# Patient Record
Sex: MALE | Race: WHITE | Employment: OTHER | URBAN - METROPOLITAN AREA
[De-identification: names, ages, dates, MRNs, and addresses within clinical notes are randomized per-mention and may not be internally consistent; named-entity substitution may affect disease eponyms.]

---

## 2018-01-22 ENCOUNTER — APPOINTMENT (OUTPATIENT)
Dept: GENERAL RADIOLOGY | Age: 28
DRG: 501 | End: 2018-01-22
Attending: EMERGENCY MEDICINE
Payer: OTHER GOVERNMENT

## 2018-01-22 ENCOUNTER — HOSPITAL ENCOUNTER (INPATIENT)
Age: 28
LOS: 4 days | Discharge: HOME | DRG: 501 | End: 2018-01-26
Attending: EMERGENCY MEDICINE | Admitting: ORTHOPAEDIC SURGERY
Payer: OTHER GOVERNMENT

## 2018-01-22 DIAGNOSIS — S96.122A: ICD-10-CM

## 2018-01-22 DIAGNOSIS — S91.312A: Primary | ICD-10-CM

## 2018-01-22 PROBLEM — S96.129A: Status: ACTIVE | Noted: 2018-01-22

## 2018-01-22 LAB
ANION GAP SERPL CALC-SCNC: 14 MMOL/L (ref 5–15)
BASOPHILS # BLD: 0 K/UL (ref 0–0.1)
BASOPHILS NFR BLD: 1 % (ref 0–1)
BUN SERPL-MCNC: 19 MG/DL (ref 6–20)
BUN/CREAT SERPL: 15 (ref 12–20)
CALCIUM SERPL-MCNC: 8.8 MG/DL (ref 8.5–10.1)
CHLORIDE SERPL-SCNC: 104 MMOL/L (ref 97–108)
CO2 SERPL-SCNC: 20 MMOL/L (ref 21–32)
CREAT SERPL-MCNC: 1.29 MG/DL (ref 0.7–1.3)
EOSINOPHIL # BLD: 0.4 K/UL (ref 0–0.4)
EOSINOPHIL NFR BLD: 4 % (ref 0–7)
ERYTHROCYTE [DISTWIDTH] IN BLOOD BY AUTOMATED COUNT: 11.7 % (ref 11.5–14.5)
GLUCOSE SERPL-MCNC: 109 MG/DL (ref 65–100)
HCT VFR BLD AUTO: 46.4 % (ref 36.6–50.3)
HGB BLD-MCNC: 16.8 G/DL (ref 12.1–17)
LYMPHOCYTES # BLD: 4.5 K/UL (ref 0.8–3.5)
LYMPHOCYTES NFR BLD: 51 % (ref 12–49)
MCH RBC QN AUTO: 31.5 PG (ref 26–34)
MCHC RBC AUTO-ENTMCNC: 36.2 G/DL (ref 30–36.5)
MCV RBC AUTO: 87.1 FL (ref 80–99)
MONOCYTES # BLD: 0.8 K/UL (ref 0–1)
MONOCYTES NFR BLD: 9 % (ref 5–13)
NEUTS SEG # BLD: 3.1 K/UL (ref 1.8–8)
NEUTS SEG NFR BLD: 35 % (ref 32–75)
PLATELET # BLD AUTO: 268 K/UL (ref 150–400)
POTASSIUM SERPL-SCNC: 3.8 MMOL/L (ref 3.5–5.1)
RBC # BLD AUTO: 5.33 M/UL (ref 4.1–5.7)
SODIUM SERPL-SCNC: 138 MMOL/L (ref 136–145)
WBC # BLD AUTO: 8.8 K/UL (ref 4.1–11.1)

## 2018-01-22 PROCEDURE — 85025 COMPLETE CBC W/AUTO DIFF WBC: CPT | Performed by: EMERGENCY MEDICINE

## 2018-01-22 PROCEDURE — 80048 BASIC METABOLIC PNL TOTAL CA: CPT | Performed by: EMERGENCY MEDICINE

## 2018-01-22 PROCEDURE — 74011250637 HC RX REV CODE- 250/637: Performed by: PHYSICIAN ASSISTANT

## 2018-01-22 PROCEDURE — 74011250636 HC RX REV CODE- 250/636: Performed by: PHYSICIAN ASSISTANT

## 2018-01-22 PROCEDURE — 74011000258 HC RX REV CODE- 258: Performed by: EMERGENCY MEDICINE

## 2018-01-22 PROCEDURE — 90715 TDAP VACCINE 7 YRS/> IM: CPT | Performed by: EMERGENCY MEDICINE

## 2018-01-22 PROCEDURE — 96361 HYDRATE IV INFUSION ADD-ON: CPT

## 2018-01-22 PROCEDURE — 86900 BLOOD TYPING SEROLOGIC ABO: CPT | Performed by: EMERGENCY MEDICINE

## 2018-01-22 PROCEDURE — 74011250636 HC RX REV CODE- 250/636

## 2018-01-22 PROCEDURE — 99283 EMERGENCY DEPT VISIT LOW MDM: CPT

## 2018-01-22 PROCEDURE — 73630 X-RAY EXAM OF FOOT: CPT

## 2018-01-22 PROCEDURE — 90471 IMMUNIZATION ADMIN: CPT

## 2018-01-22 PROCEDURE — 74011250636 HC RX REV CODE- 250/636: Performed by: EMERGENCY MEDICINE

## 2018-01-22 PROCEDURE — 96365 THER/PROPH/DIAG IV INF INIT: CPT

## 2018-01-22 PROCEDURE — 65270000029 HC RM PRIVATE

## 2018-01-22 PROCEDURE — 96375 TX/PRO/DX INJ NEW DRUG ADDON: CPT

## 2018-01-22 PROCEDURE — 75810000295 HC COMPLEX WND RPR

## 2018-01-22 PROCEDURE — 0QDR0ZZ EXTRACTION OF LEFT TOE PHALANX, OPEN APPROACH: ICD-10-PCS | Performed by: ORTHOPAEDIC SURGERY

## 2018-01-22 PROCEDURE — 74011000258 HC RX REV CODE- 258: Performed by: PHYSICIAN ASSISTANT

## 2018-01-22 PROCEDURE — 36415 COLL VENOUS BLD VENIPUNCTURE: CPT | Performed by: EMERGENCY MEDICINE

## 2018-01-22 PROCEDURE — 74011000250 HC RX REV CODE- 250: Performed by: EMERGENCY MEDICINE

## 2018-01-22 RX ORDER — ONDANSETRON 2 MG/ML
4 INJECTION INTRAMUSCULAR; INTRAVENOUS
Status: DISPENSED | OUTPATIENT
Start: 2018-01-22 | End: 2018-01-23

## 2018-01-22 RX ORDER — CEFAZOLIN SODIUM/WATER 2 G/20 ML
2 SYRINGE (ML) INTRAVENOUS EVERY 8 HOURS
Status: DISPENSED | OUTPATIENT
Start: 2018-01-22 | End: 2018-01-24

## 2018-01-22 RX ORDER — HYDROMORPHONE HYDROCHLORIDE 2 MG/ML
0.5 INJECTION, SOLUTION INTRAMUSCULAR; INTRAVENOUS; SUBCUTANEOUS
Status: DISCONTINUED | OUTPATIENT
Start: 2018-01-22 | End: 2018-01-22

## 2018-01-22 RX ORDER — FENTANYL CITRATE 50 UG/ML
INJECTION, SOLUTION INTRAMUSCULAR; INTRAVENOUS
Status: COMPLETED
Start: 2018-01-22 | End: 2018-01-22

## 2018-01-22 RX ORDER — ONDANSETRON 2 MG/ML
4 INJECTION INTRAMUSCULAR; INTRAVENOUS
Status: COMPLETED | OUTPATIENT
Start: 2018-01-22 | End: 2018-01-22

## 2018-01-22 RX ORDER — BUSPIRONE HYDROCHLORIDE 5 MG/1
5 TABLET ORAL 3 TIMES DAILY
COMMUNITY

## 2018-01-22 RX ORDER — VENLAFAXINE 37.5 MG/1
37.5 TABLET ORAL DAILY
COMMUNITY

## 2018-01-22 RX ORDER — HYDROMORPHONE HYDROCHLORIDE 2 MG/ML
0.5 INJECTION, SOLUTION INTRAMUSCULAR; INTRAVENOUS; SUBCUTANEOUS
Status: DISPENSED | OUTPATIENT
Start: 2018-01-22 | End: 2018-01-23

## 2018-01-22 RX ORDER — SODIUM CHLORIDE 0.9 % (FLUSH) 0.9 %
5-10 SYRINGE (ML) INJECTION AS NEEDED
Status: DISCONTINUED | OUTPATIENT
Start: 2018-01-22 | End: 2018-01-26 | Stop reason: HOSPADM

## 2018-01-22 RX ORDER — NALOXONE HYDROCHLORIDE 0.4 MG/ML
0.4 INJECTION, SOLUTION INTRAMUSCULAR; INTRAVENOUS; SUBCUTANEOUS AS NEEDED
Status: DISCONTINUED | OUTPATIENT
Start: 2018-01-22 | End: 2018-01-26 | Stop reason: HOSPADM

## 2018-01-22 RX ORDER — VENLAFAXINE 100 MG/1
100 TABLET ORAL DAILY
COMMUNITY
End: 2018-01-22

## 2018-01-22 RX ORDER — MORPHINE SULFATE 4 MG/ML
6 INJECTION, SOLUTION INTRAMUSCULAR; INTRAVENOUS
Status: COMPLETED | OUTPATIENT
Start: 2018-01-22 | End: 2018-01-22

## 2018-01-22 RX ORDER — DIPHENHYDRAMINE HYDROCHLORIDE 50 MG/ML
12.5 INJECTION, SOLUTION INTRAMUSCULAR; INTRAVENOUS
Status: ACTIVE | OUTPATIENT
Start: 2018-01-22 | End: 2018-01-23

## 2018-01-22 RX ORDER — ACETAMINOPHEN 500 MG
1000 TABLET ORAL EVERY 6 HOURS
Status: DISCONTINUED | OUTPATIENT
Start: 2018-01-22 | End: 2018-01-26 | Stop reason: HOSPADM

## 2018-01-22 RX ORDER — SODIUM CHLORIDE 0.9 % (FLUSH) 0.9 %
5-10 SYRINGE (ML) INJECTION EVERY 8 HOURS
Status: DISCONTINUED | OUTPATIENT
Start: 2018-01-22 | End: 2018-01-26 | Stop reason: HOSPADM

## 2018-01-22 RX ORDER — HYDROMORPHONE HYDROCHLORIDE 2 MG/1
2 TABLET ORAL
Status: DISCONTINUED | OUTPATIENT
Start: 2018-01-22 | End: 2018-01-26 | Stop reason: HOSPADM

## 2018-01-22 RX ORDER — SODIUM CHLORIDE 9 MG/ML
100 INJECTION, SOLUTION INTRAVENOUS CONTINUOUS
Status: DISCONTINUED | OUTPATIENT
Start: 2018-01-22 | End: 2018-01-24

## 2018-01-22 RX ORDER — NICOTINE 7MG/24HR
1 PATCH, TRANSDERMAL 24 HOURS TRANSDERMAL DAILY
Status: DISCONTINUED | OUTPATIENT
Start: 2018-01-23 | End: 2018-01-24

## 2018-01-22 RX ORDER — BUPIVACAINE HYDROCHLORIDE 5 MG/ML
5 INJECTION, SOLUTION EPIDURAL; INTRACAUDAL
Status: COMPLETED | OUTPATIENT
Start: 2018-01-22 | End: 2018-01-22

## 2018-01-22 RX ORDER — IBUPROFEN 200 MG
1 TABLET ORAL DAILY
Status: DISCONTINUED | OUTPATIENT
Start: 2018-01-22 | End: 2018-01-24

## 2018-01-22 RX ORDER — FENTANYL CITRATE 50 UG/ML
100 INJECTION, SOLUTION INTRAMUSCULAR; INTRAVENOUS ONCE
Status: COMPLETED | OUTPATIENT
Start: 2018-01-22 | End: 2018-01-22

## 2018-01-22 RX ORDER — HYDROMORPHONE HYDROCHLORIDE 2 MG/1
4 TABLET ORAL
Status: DISCONTINUED | OUTPATIENT
Start: 2018-01-22 | End: 2018-01-26 | Stop reason: HOSPADM

## 2018-01-22 RX ADMIN — Medication 2 G: at 20:51

## 2018-01-22 RX ADMIN — FENTANYL CITRATE 100 MCG: 50 INJECTION, SOLUTION INTRAMUSCULAR; INTRAVENOUS at 13:57

## 2018-01-22 RX ADMIN — SODIUM CHLORIDE 100 ML/HR: 900 INJECTION, SOLUTION INTRAVENOUS at 20:02

## 2018-01-22 RX ADMIN — CEFAZOLIN SODIUM 1 G: 1 INJECTION, POWDER, FOR SOLUTION INTRAMUSCULAR; INTRAVENOUS at 14:31

## 2018-01-22 RX ADMIN — BUPIVACAINE HYDROCHLORIDE 25 MG: 5 INJECTION, SOLUTION EPIDURAL; INTRACAUDAL; PERINEURAL at 17:00

## 2018-01-22 RX ADMIN — LIDOCAINE HYDROCHLORIDE 15 MG: 10; .005 INJECTION, SOLUTION EPIDURAL; INFILTRATION; INTRACAUDAL; PERINEURAL at 17:00

## 2018-01-22 RX ADMIN — HYDROMORPHONE HYDROCHLORIDE 2 MG: 2 TABLET ORAL at 22:22

## 2018-01-22 RX ADMIN — TETANUS TOXOID, REDUCED DIPHTHERIA TOXOID AND ACELLULAR PERTUSSIS VACCINE, ADSORBED 0.5 ML: 5; 2.5; 8; 8; 2.5 SUSPENSION INTRAMUSCULAR at 16:33

## 2018-01-22 RX ADMIN — GENTAMICIN SULFATE 400 MG: 40 INJECTION, SOLUTION INTRAMUSCULAR; INTRAVENOUS at 20:04

## 2018-01-22 RX ADMIN — SODIUM CHLORIDE 1000 ML: 900 INJECTION, SOLUTION INTRAVENOUS at 13:55

## 2018-01-22 RX ADMIN — ACETAMINOPHEN 1000 MG: 325 TABLET, FILM COATED ORAL at 20:43

## 2018-01-22 RX ADMIN — ONDANSETRON HYDROCHLORIDE 4 MG: 2 INJECTION, SOLUTION INTRAMUSCULAR; INTRAVENOUS at 16:33

## 2018-01-22 RX ADMIN — HYDROMORPHONE HYDROCHLORIDE 2 MG: 2 TABLET ORAL at 20:43

## 2018-01-22 RX ADMIN — MORPHINE SULFATE 6 MG: 4 INJECTION, SOLUTION INTRAMUSCULAR; INTRAVENOUS at 16:34

## 2018-01-22 NOTE — ED PROVIDER NOTES
EMERGENCY DEPARTMENT HISTORY AND PHYSICAL EXAM      Date: 1/22/2018  Patient Name: Elzbieta Almeida    History of Presenting Illness     Chief Complaint   Patient presents with    Laceration     pt cut left foot with chainsaw, actively bleeding. pt states clotting disorder       History Provided By: Patient    HPI: Elzbieta Almeida, 28 y.o. male with PMHx significant for Von Willebrand disease, presents ambulatory to the ED with cc of laceration to his left foot. Pt reports he cut himself while using a chainsaw PTA today. He denies other injury at time of examination. Pt denies taking any medications on a daily basis. He denies taking any medications for his current symptoms. Pt specifically denies nausea, vomiting, fever, or chills. PCP: None    There are no other complaints, changes, or physical findings at this time. Current Outpatient Prescriptions   Medication Sig Dispense Refill    venlafaxine (EFFEXOR) 100 mg tablet Take 100 mg by mouth daily. Past History     Past Medical History:  Past Medical History:   Diagnosis Date    Depression     Psychiatric disorder     Von Willebrand disease ([facility] [address]        Past Surgical History:  Past Surgical History:   Procedure Laterality Date    HX ORTHOPAEDIC      calf, eduardo       Family History:  History reviewed. No pertinent family history. Social History:  Social History   Substance Use Topics    Smoking status: Current Every Day Smoker     Packs/day: 0.25    Smokeless tobacco: Current User    Alcohol use Yes      Comment: rare       Allergies: Allergies not on file      Review of Systems   Review of Systems   Constitutional: Negative. Negative for appetite change, chills, fatigue and fever. HENT: Negative. Negative for congestion, rhinorrhea, sinus pressure and sore throat. Eyes: Negative. Respiratory: Negative. Negative for cough, choking, chest tightness, shortness of breath and wheezing. Cardiovascular: Negative.   Negative for chest pain, palpitations and leg swelling. Gastrointestinal: Negative for abdominal pain, constipation, diarrhea, nausea and vomiting. Endocrine: Negative. Genitourinary: Negative. Negative for difficulty urinating, dysuria, flank pain and urgency. Musculoskeletal: Negative. Skin: Positive for wound (left foot). Neurological: Negative. Negative for dizziness, speech difficulty, weakness, light-headedness, numbness and headaches. Psychiatric/Behavioral: Negative. All other systems reviewed and are negative. Physical Exam   Physical Exam   Constitutional: She is oriented to person, place, and time. She appears well-developed and well-nourished. No distress. HENT:   Head: Normocephalic and atraumatic. Mouth/Throat: Oropharynx is clear and moist. No oropharyngeal exudate. Eyes: Conjunctivae and EOM are normal. Pupils are equal, round, and reactive to light. Neck: Normal range of motion. Neck supple. No JVD present. No tracheal deviation present. Cardiovascular: Normal rate, regular rhythm, normal heart sounds and intact distal pulses. No murmur heard. Pulmonary/Chest: Effort normal and breath sounds normal. No stridor. No respiratory distress. She has no wheezes. She has no rales. She exhibits no tenderness. Musculoskeletal: Normal range of motion. He exhibits no edema. Open wound of left foot ~3cm in length overlying the 1st metatarsal, pt is unable to extended left great toe, cap refill 3 secs, DP,PT intact   Neurological: She is alert and oriented to person, place, and time. No cranial nerve deficit. No gross motor or sensory deficits    Skin: Skin is warm and dry. She is not diaphoretic. Psychiatric:   Pt very anxious    Nursing note and vitals reviewed.         Diagnostic Study Results     Labs -     Recent Results (from the past 12 hour(s))   CBC WITH AUTOMATED DIFF    Collection Time: 01/22/18  2:15 PM   Result Value Ref Range    WBC 8.8 4.1 - 11.1 K/uL    RBC 5.33 4.10 - 5.70 M/uL    HGB 16.8 12.1 - 17.0 g/dL    HCT 46.4 36.6 - 50.3 %    MCV 87.1 80.0 - 99.0 FL    MCH 31.5 26.0 - 34.0 PG    MCHC 36.2 30.0 - 36.5 g/dL    RDW 11.7 11.5 - 14.5 %    PLATELET 477 019 - 280 K/uL    NEUTROPHILS 35 32 - 75 %    LYMPHOCYTES 51 (H) 12 - 49 %    MONOCYTES 9 5 - 13 %    EOSINOPHILS 4 0 - 7 %    BASOPHILS 1 0 - 1 %    ABS. NEUTROPHILS 3.1 1.8 - 8.0 K/UL    ABS. LYMPHOCYTES 4.5 (H) 0.8 - 3.5 K/UL    ABS. MONOCYTES 0.8 0.0 - 1.0 K/UL    ABS. EOSINOPHILS 0.4 0.0 - 0.4 K/UL    ABS. BASOPHILS 0.0 0.0 - 0.1 K/UL   METABOLIC PANEL, BASIC    Collection Time: 01/22/18  2:15 PM   Result Value Ref Range    Sodium 138 136 - 145 mmol/L    Potassium 3.8 3.5 - 5.1 mmol/L    Chloride 104 97 - 108 mmol/L    CO2 20 (L) 21 - 32 mmol/L    Anion gap 14 5 - 15 mmol/L    Glucose 109 (H) 65 - 100 mg/dL    BUN 19 6 - 20 MG/DL    Creatinine 1.29 0.70 - 1.30 MG/DL    BUN/Creatinine ratio 15 12 - 20      GFR est AA >60 >60 ml/min/1.73m2    GFR est non-AA >60 >60 ml/min/1.73m2    Calcium 8.8 8.5 - 10.1 MG/DL       Radiologic Studies -   XR FOOT LT MIN 3 V   Final Result   EXAM:  XR FOOT LEFT MIN 3 V     INDICATION:   Chainsaw injury to left foot.     COMPARISON:  None.     FINDINGS:  Three views of the right foot demonstrate a 10 x 7 x 10 mm defect in  the dorsal medial aspect of the distal first metatarsal with overlying bandage  artifact. Soft tissue swelling is present.     IMPRESSION  IMPRESSION: Partial open fracture of the left distal first metatarsal.       Medical Decision Making   I am the first provider for this patient. I reviewed the vital signs, available nursing notes, past medical history, past surgical history, family history and social history. Vital Signs-Reviewed the patient's vital signs.   Patient Vitals for the past 12 hrs:   Temp Pulse Resp BP SpO2   01/22/18 1400 97.7 °F (36.5 °C) (!) 102 (!) 44 (!) 154/115 99 %       Pulse Oximetry Analysis - 99% on RA    Cardiac Monitor:     Records Reviewed: Nursing Notes and Old Medical Records    Provider Notes (Medical Decision Making):     DDx: open fx, laceration, acute blood loss anemia    ED Course:   Initial assessment performed. The patients presenting problems have been discussed, and they are in agreement with the care plan formulated and outlined with them. I have encouraged them to ask questions as they arise throughout their visit. Upon arrival pt still wearing work boots, the integrity of the boot has been compromised and there is blood coming from the top of the boot, active bleeding suspect but unable to visualized the wound. Tourniquet applied and tight till bleeding stopped, no loss of distal pulses with tourniquet, Boot was removed and wound identified, wound was injected with 8 cc's Lidocaine 1% with epi, tourniquet taken off, no active bleeding, non-adherent dressing applied, will dose pt with IV Ab and pt will be given tetanus, Shashank Bucio DO    Xray performed, concern for tendon rupture, ? Fracture will ortho evaluate, Shashank Bucio DO      Consult Note:  4:06 PM  Shashank Bucio DO spoke with Chely Dior MD  Specialty: Orthopedics  Discussed pts hx, disposition, and available diagnostic and imaging results. Reviewed care plans. Consultant agrees with plans as outlined. He will see pt in ED    SIGN OUT:  4:06 PM  Patient's presentation, labs/imaging and plan of care was reviewed with Dharmesh Thapa DO as part of sign out. They will assume care as part of the plan discussed with the patient. Dharmesh Thapa DO's assistance in completion of this plan is greatly appreciated but it should be noted that I will be the provider of record for this patient. Dominique Servin DO    Disposition:  Pt taken to the OR by orthopaedic for wound exploration and repair, Shashank Bucio DO        PLAN:  1. Current Discharge Medication List        2.    Follow-up Information     None        Return to ED if worse     Diagnosis     Clinical Impression: 1. Laceration of left foot excluding toes with complication, initial encounter        Attestations: This note is prepared by Ryan Hunt, acting as Scribe for Katja Gordon DO: The scribe's documentation has been prepared under my direction and personally reviewed by me in its entirety. I confirm that the note above accurately reflects all work, treatment, procedures, and medical decision making performed by me.

## 2018-01-22 NOTE — ED NOTES
Received pt to exam room, resting on stretcher in position of comfort, call bell given to pt; pt states he was using a chainsaw that slipped, cutting throught left foot boot, upon arrival blood is squirting through boot, 2 tourniquets and manual pressure applied, dr Renetta Antunez to room to evaluate; foot continued to bleed, trauma tourniquet applied to right thigh by dr Renetta Antunez

## 2018-01-22 NOTE — H&P
Orthopedic Generic Pre-Op History and Physical    2018 5:43 PM     Jamey Melton  [id number]  male  28 y.o.   1990    Subjective:      Jamey Melton is a 28 y.o. male who presents for evaluation of Left Foot, Great Toe, extensor hallucis tendon lacertion after a chainsaw accident. Presented to ED and consulted for evaluation. He has a large laceration on the dorsal medial aspect of the left foot at the level of the base of the 1st metatarsal, inability to move his great toe. 10/10 pain. Past Medical History:   Diagnosis Date    Depression     Psychiatric disorder     Von Willebrand disease ([facility] [address]      Past Surgical History:   Procedure Laterality Date    HX ORTHOPAEDIC      calf, eduardo      History reviewed. No pertinent family history. Social History   Substance Use Topics    Smoking status: Current Every Day Smoker     Packs/day: 0.25    Smokeless tobacco: Current User    Alcohol use Yes      Comment: rare      Prior to Admission medications    Medication Sig Start Date End Date Taking? Authorizing Provider   venlafaxine (EFFEXOR) 100 mg tablet Take 100 mg by mouth daily. Yes Phys Other, MD      No Known Allergies    Review of Systems    Objective:     Vitals:    18 1400 18 1637   BP: (!) 154/115 (!) 138/96   Pulse: (!) 102 66   Resp: (!) 44 16   Temp: 97.7 °F (36.5 °C)    SpO2: 99% 99%   Weight: 88.5 kg (195 lb)    Height: 5' 11\" (1.803 m)        Temp (24hrs), Av.7 °F (36.5 °C), Min:97.7 °F (36.5 °C), Max:97.7 °F (36.5 °C)        Assessment:   Left foot laceration  Left extensor hallucis tendon laceration    Plan:   I and D with Donald Owen in the ED with attempted extensor hallucis repair.   Admit for IV antibiotics Ancef and Gent  Pain control with hydromorphone   Left leg partial weight bearing on the heel  Follow up with Dr. Osorio Settler outpatient after IV antibiotics    Signed By: Zannie Gosselin, PA     2018       Date of Surgery Update:  Jamey Melton was seen and examined. History and physical has been reviewed. The patient has been examined. There have been no significant clinical changes since the completion of the originally dated History and Physical.  Patient identified by surgeon; surgical site was confirmed by patient and surgeon. Plan to proceed with I&D, exploration and primary EHL repair vs. EDL to EHL transfer and EDB to EDL transfer. Will require 24 - 48 of iv abx postop depending on contamination of wound upon intraop inspection. Signed By: Blayne Soares MD     January 24, 2018 11:51 AM

## 2018-01-22 NOTE — PROGRESS NOTES
Pharmacy Clarification of the Prior to Admission Medication Regimen Retrospective to the Admission Medication Reconciliation    The patient was interviewed regarding clarification of the prior to admission medication regimen. Patient's significant other was present in room and obtained permission from patient to discuss drug regimen with visitor(s) present. Patient was questioned regarding use of any other inhalers, topical products, over the counter medications, herbal medications, vitamin products or ophthalmic/nasal/otic medication use. Information Obtained From: Patient, Patient's Significant Other, and MARIS RUBIO    Recommendations/Findings: The following amendments were made to the patient's active medication list on file at [facility]:     1) Additions:    DM/P-EPHED/ACETAMINOPH/DOXYLAM (NYQUIL PO)   DM/PSEUDOEPHED/ACETAMINOPH/CPM (THERAFLU MS SEVERE COLD &COUGH PO)   DM/PSEUDOEPHED/ACETAMINOPHEN (VICKS DAYQUIL PO)   busPIRone (BUSPAR) 5 mg tablet    2) Removals: None     3) Changes:   venlafaxine (EFFEXOR) tablet (Old regimen: (strength/dose) 100 mg tab/100 mg /New regimen: (strength/dose) 37.5 mg tab/37.5 mg)    4) Pertinent Pharmacy Findings:   Updated patients preferred outpatient pharmacy to: [facility] [place], [facility] [address] [place] [address]   busPIRone (BUSPAR) 5 mg tablet: Patient states that he is should be taking this agent, but does not take it.  DM/P-EPHED/ACETAMINOPH/DOXYLAM (NYQUIL PO), DM/PSEUDOEPHED/ACETAMINOPH/CPM (THERAFLU MS SEVERE COLD &COUGH PO), and DM/PSEUDOEPHED/ACETAMINOPHEN (VICKS DAYQUIL PO): Patient stated that he took these PRN OTC agents at home, but was unaware of the strengths. PTA medication list was corrected to the following:     Prior to Admission Medications   Prescriptions Last Dose Informant Patient Reported? Taking?    DM/P-EPHED/ACETAMINOPH/DOXYLAM (NYQUIL PO) 1/15/2018 at Unknown time Self Yes Yes   Sig: Take  by mouth daily as needed for Cough ('congestion'). Patient takes 'one small cupful. '   DM/PSEUDOEPHED/ACETAMINOPH/CPM (THERAFLU MS SEVERE COLD &COUGH PO) 1/15/2018 at Unknown time Self Yes Yes   Sig: Take  by mouth daily as needed for Cough ('congestion'). Patient takes 'one small cupful. '   DM/PSEUDOEPHED/ACETAMINOPHEN (VICKS DAYQUIL PO) 1/15/2018 at Unknown time Self Yes Yes   Sig: Take  by mouth daily as needed for Cough ('congestion'). Patient takes 'one small cupful.'   busPIRone (BUSPAR) 5 mg tablet Not Taking at Unknown time Other Yes No   Sig: Take 5 mg by mouth three (3) times daily. venlafaxine (EFFEXOR) 37.5 mg tablet 1/22/2018 at Unknown time Other Yes Yes   Sig: Take 37.5 mg by mouth daily.       Facility-Administered Medications: None          Thank you,  Palak Villa, [facility]  Medication History Pharmacy Technician

## 2018-01-22 NOTE — PROGRESS NOTES
Pharmacy Automatic Renal Dosing Protocol - Antimicrobials    Indication for Antimicrobials: SSTI     Current Regimen of Each Antimicrobial:  Cefazolin 2 gm every 8 hours for 48 hours (Start Date ; Day # 1)  Gentamicin 1.5 mg/kg every 8 hours (, day 1)    Goal Level: PULSE DOSING GENTAMICIN  Peak: 15 - 20 mcg/mL  Trough: < or = 0.3 mcg/mL    Measured / Extrapolated Vancomycin Level:    /     Significant Cultures:       Paralysis, amputations, malnutrition:     Labs:  Recent Labs      18   1415   CREA  1.29   BUN  19   WBC  8.8     Temp (24hrs), Av.7 °F (36.5 °C), Min:97.7 °F (36.5 °C), Max:97.7 °F (36.5 °C)      Creatinine Clearance (mL/min) or Dialysis: 90  No results found for: PCT    Impression/Plan:   - pulse dosing gentamicin 400 mg every 24 hours  - Ancef 2 gm every 8 hours is appropriate for CrCl, however it is ordered to stop in 2 days? Pharmacy will follow daily and adjust medications as appropriate for renal function and/or serum levels. Thank you,  Rosa Asencio, PHARMD          Recommended duration of therapy  http://[facility]/[place]/[facility]/Pharmacy/Clinical%20Companion/Duration%20of%20ABX%20therapy. docx    Renal Dosing  http://[facility]/[place]/[facility]/Pharmacy/Clinical%20Companion/Renal%[email or web address]. pdf

## 2018-01-22 NOTE — IP AVS SNAPSHOT
[address] [facility] 
[phone] Patient: Zan Mcmillan MRN: [id number] [facility] [place]:1/78/7224 About your hospitalization You were admitted on:  January 22, 2018 You last received care in the:  [facility] ORTHOPEDICS You were discharged on:  January 25, 2018 Why you were hospitalized Your primary diagnosis was:  Not on File Your diagnoses also included:  Laceration Of Extensor Hallucis Longus Tendon Follow-up Information Follow up With Details Comments Contact Info Eran Rivera MD In 2 weeks  [address] [facility] [address] [facility]. 
[phone] None   None (395) Patient stated that they have no PCP Discharge Orders None A check ignacio indicates which time of day the medication should be taken. My Medications START taking these medications Instructions Each Dose to Equal  
 Morning Noon Evening Bedtime HYDROmorphone 2 mg tablet Commonly known as:  DILAUDID Your last dose was: Your next dose is: Take 1 Tab by mouth every four (4) hours as needed. Max Daily Amount: 12 mg.  
 2 mg CONTINUE taking these medications Instructions Each Dose to Equal  
 Morning Noon Evening Bedtime  
 busPIRone 5 mg tablet Commonly known as:  BUSPAR Your last dose was: Your next dose is: Take 5 mg by mouth three (3) times daily. 5 mg NYQUIL PO Your last dose was: Your next dose is: Take  by mouth daily as needed for Cough ('congestion'). Patient takes 'one small cupful.' THERAFLU MS SEVERE COLD &COUGH PO Your last dose was: Your next dose is: Take  by mouth daily as needed for Cough ('congestion'). Patient takes 'one small cupful.'  
     
   
   
   
  
 venlafaxine 37.5 mg tablet Commonly known as:  [facility] Your last dose was: Your next dose is: Take 37.5 mg by mouth daily. 37.5 mg  
    
   
   
   
  
 VICKS DAYQUIL PO Your last dose was: Your next dose is: Take  by mouth daily as needed for Cough ('congestion'). Patient takes 'one small cupful.' Where to Get Your Medications Information on where to get these meds will be given to you by the nurse or doctor. ! Ask your nurse or doctor about these medications HYDROmorphone 2 mg tablet Discharge Instructions [address] [facility] Jazmín Mcbride MD 
Postoperative Instructions Right/Left Lower Extremity: 
 ___Non Weight Bearing    ___Postop Shoe 
 ___Heel touch weightbearing               ___CAM Boot 
 _X__Weightbearing as tolerated   ___Splint/Cast 
 
Dressing: 
 _X__Keep Dressing or Splint clean & dry 
 _X__Do Not remove dressing; Dressing will be changed at first postoperative visit. 
 ___Other:   
 
Olga Mems: ? You may shower 48 hours after your surgery. Do Not allow dressing or splint to get wet! Diet:  
? Advance diet as tolerated. You may experience nausea due to anesthesia, pain or pain medications. You should avoid spicy or heavy meals initially. Pain Management: 
? If you have received a block, the pain relief can last from 4-24 hours. This also means you may not have sensation or movement in your foot for the same amount of time. ? You will have pain after the block wears off. Anticipate this and start pain medications prior to the block wearing off. 
? Do Not drive while taking narcotic pain medications. Elevation: 
? Strict elevation at or just above the level of your heart for the first 14 days after surgery. Limit time that foot is in dependent position for trips to bathroom and kitchen as much as possible. Early control of swelling will improve future swelling. Ice: ? __X___ Rudolph Snow may ice your surgical extremity for no longer than 20 minutes at a time, 3-4 times per day. Do Not apply ice directly to the skin. ? _____ Do Not apply ice to surgical extremity. ELEVATE THE LEFT LEG TO HELP WITH PAIN AND SWELLING Call our office at [phone] if the following occur: ? Severe swelling, profuse bleeding or severe pain which does not improve with pain medication. ? Fever greater than 101.0; fevers less than this are very common in the first few days after surgery and are unlikely to indicate infection. Follow up: In 2 weeks Introducing [facility]! Gena Bran introduces [employer] patient portal. Now you can access parts of your medical record, email your doctor's office, and request medication refills online. 1. In your internet browser, go to [email or web address] 2. Click on the First Time User? Click Here link in the Sign In box. You will see the New Member Sign Up page. 3. Enter your [employer] Access Code exactly as it appears below. You will not need to use this code after youve completed the sign-up process. If you do not sign up before the expiration date, you must request a new code. · [employer] Access Code: [email or web address] Expires: 4/22/2018  2:25 PM 
 
4. Enter the last four digits of your Social Security Number (xxxx) and Date of Birth (mm/dd/yyyy) as indicated and click Submit. You will be taken to the next sign-up page. 5. Create a [employer] ID. This will be your [employer] login ID and cannot be changed, so think of one that is secure and easy to remember. 6. Create a [employer] password. You can change your password at any time. 7. Enter your Password Reset Question and Answer. This can be used at a later time if you forget your password. 8. Enter your e-mail address. You will receive e-mail notification when new information is available in [address] 9. Click Sign Up. You can now view and download portions of your medical record. 10. Click the Download Summary menu link to download a portable copy of your medical information. If you have questions, please visit the Frequently Asked Questions section of the [employer] website. Remember, [employer] is NOT to be used for urgent needs. For medical emergencies, dial 911. Now available from your iPhone and Android! Providers Seen During Your Hospitalization Provider Specialty Primary office phone Susan Figueredo DO Emergency Medicine [phone] Caty Townsend MD Orthopedic Surgery [phone] Eran GARCIA [address] [place] [address] 2050, [address] [facility] Orthopedic Surgery [phone] Immunizations Administered for This Admission Name Date Tdap 1/22/2018 Your Primary Care Physician (PCP) Primary Care Physician Office Phone Office Fax NONE ** None ** ** None ** You are allergic to the following No active allergies Recent Documentation Height Weight BMI Smoking Status 1.803 m 88.5 kg 27.2 kg/m2 Current Every Day Smoker Emergency Contacts Name Discharge Info Relation Home Work Mobile MasonFidelina DISCHARGE CAREGIVER [3] Spouse [3] [phone] Patient Belongings The following personal items are in your possession at time of discharge: 
  Dental Appliances: None  Visual Aid: Glasses, Contacts, At bedside      Home Medications: None   Jewelry: None  Clothing:  (came down in gown, glasses to recovery)    Other Valuables: Cell Phone, Eyeglasses, Cigarettes  Personal Items Sent to Safe: none Please provide this summary of care documentation to your next provider. Signatures-by signing, you are acknowledging that this After Visit Summary has been reviewed with you and you have received a copy. Patient Signature:  ____________________________________________________________ Date:  ____________________________________________________________  
  
Loyda Must Provider Signature:  ____________________________________________________________ Date:  ____________________________________________________________

## 2018-01-23 ENCOUNTER — ANESTHESIA EVENT (OUTPATIENT)
Dept: SURGERY | Age: 28
DRG: 501 | End: 2018-01-23
Payer: OTHER GOVERNMENT

## 2018-01-23 LAB
ABO + RH BLD: NORMAL
ANION GAP SERPL CALC-SCNC: 5 MMOL/L (ref 5–15)
BASOPHILS # BLD: 0 K/UL (ref 0–0.1)
BASOPHILS NFR BLD: 0 % (ref 0–1)
BLOOD GROUP ANTIBODIES SERPL: NORMAL
BUN SERPL-MCNC: 17 MG/DL (ref 6–20)
BUN/CREAT SERPL: 18 (ref 12–20)
CALCIUM SERPL-MCNC: 7.9 MG/DL (ref 8.5–10.1)
CHLORIDE SERPL-SCNC: 107 MMOL/L (ref 97–108)
CO2 SERPL-SCNC: 27 MMOL/L (ref 21–32)
CREAT SERPL-MCNC: 0.94 MG/DL (ref 0.7–1.3)
DATE LAST DOSE: NORMAL
EOSINOPHIL # BLD: 0.1 K/UL (ref 0–0.4)
EOSINOPHIL NFR BLD: 1 % (ref 0–7)
ERYTHROCYTE [DISTWIDTH] IN BLOOD BY AUTOMATED COUNT: 12 % (ref 11.5–14.5)
GENTAMICIN SERPL-MCNC: <0.2 UG/ML
GLUCOSE SERPL-MCNC: 114 MG/DL (ref 65–100)
HCT VFR BLD AUTO: 39.7 % (ref 36.6–50.3)
HGB BLD-MCNC: 14.3 G/DL (ref 12.1–17)
LYMPHOCYTES # BLD: 1.5 K/UL (ref 0.8–3.5)
LYMPHOCYTES NFR BLD: 13 % (ref 12–49)
MCH RBC QN AUTO: 32.2 PG (ref 26–34)
MCHC RBC AUTO-ENTMCNC: 36 G/DL (ref 30–36.5)
MCV RBC AUTO: 89.4 FL (ref 80–99)
MONOCYTES # BLD: 0.8 K/UL (ref 0–1)
MONOCYTES NFR BLD: 7 % (ref 5–13)
NEUTS SEG # BLD: 9.4 K/UL (ref 1.8–8)
NEUTS SEG NFR BLD: 79 % (ref 32–75)
PLATELET # BLD AUTO: 193 K/UL (ref 150–400)
POTASSIUM SERPL-SCNC: 3.4 MMOL/L (ref 3.5–5.1)
RBC # BLD AUTO: 4.44 M/UL (ref 4.1–5.7)
REPORTED DOSE,DOSE: NORMAL UNITS
REPORTED DOSE/TIME,TMG: NORMAL
SODIUM SERPL-SCNC: 139 MMOL/L (ref 136–145)
SPECIMEN EXP DATE BLD: NORMAL
WBC # BLD AUTO: 11.8 K/UL (ref 4.1–11.1)

## 2018-01-23 PROCEDURE — 74011250637 HC RX REV CODE- 250/637: Performed by: PHYSICIAN ASSISTANT

## 2018-01-23 PROCEDURE — 74011250637 HC RX REV CODE- 250/637: Performed by: NURSE PRACTITIONER

## 2018-01-23 PROCEDURE — 74011250636 HC RX REV CODE- 250/636: Performed by: ORTHOPAEDIC SURGERY

## 2018-01-23 PROCEDURE — 80170 ASSAY OF GENTAMICIN: CPT | Performed by: ORTHOPAEDIC SURGERY

## 2018-01-23 PROCEDURE — 74011250636 HC RX REV CODE- 250/636: Performed by: PHYSICIAN ASSISTANT

## 2018-01-23 PROCEDURE — 74011000258 HC RX REV CODE- 258: Performed by: PHYSICIAN ASSISTANT

## 2018-01-23 PROCEDURE — 65270000029 HC RM PRIVATE

## 2018-01-23 PROCEDURE — 36415 COLL VENOUS BLD VENIPUNCTURE: CPT | Performed by: PHYSICIAN ASSISTANT

## 2018-01-23 PROCEDURE — 85025 COMPLETE CBC W/AUTO DIFF WBC: CPT | Performed by: EMERGENCY MEDICINE

## 2018-01-23 PROCEDURE — 80048 BASIC METABOLIC PNL TOTAL CA: CPT | Performed by: PHYSICIAN ASSISTANT

## 2018-01-23 RX ORDER — SCOLOPAMINE TRANSDERMAL SYSTEM 1 MG/1
1 PATCH, EXTENDED RELEASE TRANSDERMAL
Status: DISCONTINUED | OUTPATIENT
Start: 2018-01-23 | End: 2018-01-24

## 2018-01-23 RX ORDER — ONDANSETRON 2 MG/ML
4 INJECTION INTRAMUSCULAR; INTRAVENOUS
Status: DISCONTINUED | OUTPATIENT
Start: 2018-01-23 | End: 2018-01-24

## 2018-01-23 RX ORDER — SODIUM CHLORIDE 9 MG/ML
100 INJECTION, SOLUTION INTRAVENOUS CONTINUOUS
Status: DISCONTINUED | OUTPATIENT
Start: 2018-01-24 | End: 2018-01-24

## 2018-01-23 RX ORDER — HYDROMORPHONE HYDROCHLORIDE 2 MG/ML
0.5 INJECTION, SOLUTION INTRAMUSCULAR; INTRAVENOUS; SUBCUTANEOUS
Status: DISCONTINUED | OUTPATIENT
Start: 2018-01-23 | End: 2018-01-24

## 2018-01-23 RX ADMIN — SODIUM CHLORIDE 100 ML/HR: 900 INJECTION, SOLUTION INTRAVENOUS at 23:42

## 2018-01-23 RX ADMIN — HYDROMORPHONE HYDROCHLORIDE 0.5 MG: 2 INJECTION INTRAMUSCULAR; INTRAVENOUS; SUBCUTANEOUS at 16:58

## 2018-01-23 RX ADMIN — Medication 10 ML: at 06:34

## 2018-01-23 RX ADMIN — HYDROMORPHONE HYDROCHLORIDE 4 MG: 2 TABLET ORAL at 15:22

## 2018-01-23 RX ADMIN — HYDROMORPHONE HYDROCHLORIDE 0.5 MG: 2 INJECTION INTRAMUSCULAR; INTRAVENOUS; SUBCUTANEOUS at 23:41

## 2018-01-23 RX ADMIN — ONDANSETRON HYDROCHLORIDE 4 MG: 2 INJECTION, SOLUTION INTRAMUSCULAR; INTRAVENOUS at 00:42

## 2018-01-23 RX ADMIN — ACETAMINOPHEN 1000 MG: 325 TABLET, FILM COATED ORAL at 06:33

## 2018-01-23 RX ADMIN — ONDANSETRON HYDROCHLORIDE 4 MG: 2 INJECTION, SOLUTION INTRAMUSCULAR; INTRAVENOUS at 13:00

## 2018-01-23 RX ADMIN — Medication 10 ML: at 23:40

## 2018-01-23 RX ADMIN — ACETAMINOPHEN 1000 MG: 325 TABLET, FILM COATED ORAL at 02:30

## 2018-01-23 RX ADMIN — HYDROMORPHONE HYDROCHLORIDE 4 MG: 2 TABLET ORAL at 02:30

## 2018-01-23 RX ADMIN — ACETAMINOPHEN 1000 MG: 325 TABLET, FILM COATED ORAL at 11:04

## 2018-01-23 RX ADMIN — Medication 2 G: at 22:03

## 2018-01-23 RX ADMIN — ACETAMINOPHEN 1000 MG: 325 TABLET, FILM COATED ORAL at 17:49

## 2018-01-23 RX ADMIN — HYDROMORPHONE HYDROCHLORIDE 0.5 MG: 2 INJECTION INTRAMUSCULAR; INTRAVENOUS; SUBCUTANEOUS at 03:53

## 2018-01-23 RX ADMIN — HYDROMORPHONE HYDROCHLORIDE 4 MG: 2 TABLET ORAL at 20:26

## 2018-01-23 RX ADMIN — HYDROMORPHONE HYDROCHLORIDE 4 MG: 2 TABLET ORAL at 06:33

## 2018-01-23 RX ADMIN — Medication 10 ML: at 13:01

## 2018-01-23 RX ADMIN — Medication 2 G: at 13:16

## 2018-01-23 RX ADMIN — HYDROMORPHONE HYDROCHLORIDE 0.5 MG: 2 INJECTION INTRAMUSCULAR; INTRAVENOUS; SUBCUTANEOUS at 00:16

## 2018-01-23 RX ADMIN — GENTAMICIN SULFATE 400 MG: 40 INJECTION, SOLUTION INTRAMUSCULAR; INTRAVENOUS at 17:49

## 2018-01-23 RX ADMIN — Medication 2 G: at 06:33

## 2018-01-23 RX ADMIN — Medication 10 ML: at 22:05

## 2018-01-23 RX ADMIN — ONDANSETRON HYDROCHLORIDE 4 MG: 2 INJECTION, SOLUTION INTRAMUSCULAR; INTRAVENOUS at 06:32

## 2018-01-23 RX ADMIN — HYDROMORPHONE HYDROCHLORIDE 0.5 MG: 2 INJECTION INTRAMUSCULAR; INTRAVENOUS; SUBCUTANEOUS at 08:31

## 2018-01-23 RX ADMIN — SODIUM CHLORIDE 100 ML/HR: 900 INJECTION, SOLUTION INTRAVENOUS at 09:33

## 2018-01-23 RX ADMIN — HYDROMORPHONE HYDROCHLORIDE 4 MG: 2 TABLET ORAL at 11:00

## 2018-01-23 NOTE — PROGRESS NOTES
Bedside and Verbal shift change report given to Claire Valdes RN (oncoming nurse) by RUBEN Miller (offgoing nurse). Report included the following information SBAR, Kardex, Intake/Output, MAR and Recent Results.

## 2018-01-23 NOTE — PERIOP NOTES
Spoke with Arnaldo Cosme, RN for patient. Confirmed hx of VonWillebrands disease, does not see a hematologist.    [phone] call to patient. States that he was diagnosed with VonWillebrand's disease ~ 2 years ago by a hematologist in [place]. States that he has never been placed on medications for it, and has had 2 fasciotomies to his leg and a tooth extraction since diagnosis. Was not placed on any meds, and did not have did fine with all procedures, with no bleeding problems. Pt does not currently have a hematologist.    0 Per Dr. Sanjuana Lauren, case to be moved to the main OR. Call to Dr. Alli Siddiqui office Dav Maria notified. [address] Per Dr. Ye Hansen, she spoke with Dr. Rocio Bolanos by phone. Per  [address] 2050, pt has no clinical signs of Cammie Minium, no active bleeding despite wound to leg. Will proceed with case in [facility] per Dr. Ye Hansen.

## 2018-01-23 NOTE — PROGRESS NOTES
Plan for surgery with Dr. Emma Clarke on Wed afternoon  NPO after midnight   Continue IV ancef and Gent as per discussion with Dr. Emma Clarke   Consent for - Incisional Debridement of Left foot open fracture, exploration of with possible primary extensor hallucis longus repair verus tendon transfer-

## 2018-01-23 NOTE — ROUTINE PROCESS
Bedside and Verbal shift change report given to Rama (oncoming nurse) by Julia Lora RN (offgoing nurse). Report included the following information SBAR, Kardex, ED Summary, Procedure Summary, Intake/Output, MAR, Accordion, Recent Results and Med Rec Status.

## 2018-01-23 NOTE — PROGRESS NOTES
Paged on-call for Dr. Ritika Garcia in regards to ordering nicotine patch for patient due to patient requesting to go outside in a wheelchair to smoke. Got a call back from [facility] who ordered one day's worth of 21 patch.

## 2018-01-23 NOTE — PROGRESS NOTES
Spiritual Care Partner Volunteer visited patient on 1/23/18. Documented by:    Jin [email or web address].   Spiritual Care Department  If needs arise please call Christopher-LEIF ([phone])

## 2018-01-23 NOTE — PROGRESS NOTES
Primary Nurse Claire Stokes and Jenna Knox RN performed a dual skin assessment on this patient Impairment noted- see wound doc flow sheet  Darrian score is 20

## 2018-01-23 NOTE — OP NOTES
[facility]  ACUTE CARE OP NOTE    Val Hylton  MR#: [id number]  : 1990  ACCOUNT #: [de-identified]   DATE OF SERVICE: 2018    PREOPERATIVE DIAGNOSIS:  Chainsaw injury, left dorsal distal third first metatarsal, foot with extensor hallucis longus disruption and bone fracture, open wound. POSTOPERATIVE DIAGNOSIS:  Chainsaw injury, left dorsal distal third first metatarsal, foot with extensor hallucis longus disruption and bone fracture, open wound. PROCEDURES PERFORMED:  Open irrigation and debridement of dorsal foot wound followed by attempted extensor hallucis longus repair and primary closure. SURGEON:  Phil Cordero MD.     ASSISTANT:  Britany Ramírez. COMPLICATIONS:  Tissue and bone loss. ANESTHESIA:  Lidocaine 1% in a digital block. ESTIMATED BLOOD LOSS: 30cc . SPECIMENS REMOVED: none. IMPLANTS: no.     HISTORY:  This patient was in a trailer with a pile of brush and bess went through his boot, through his sock that he had on for 1 day and an open wound created on the dorsum of his first distal third metatarsal.  He comes into the emergency room and I am consulted for treatment. X-rays revealed not really a fracture of the first metatarsal, but a divot that had been ground out by the chainsaw, comprising about 1/2 of the volume of the first metatarsal for a width of about 3/4 of an inch to a depth of about 3/4 of an inch including gross soft tissue loss dorsal and medial from the links of the chainsaw. In the emergency room, he was given tetanus toxoid and IV antibiotics and consented for an irrigation and debridement and attempted closure and after this consent was obtained, I injected 1% lidocaine in a digital block format proximal on both sides of the first metatarsal and around the skin edges.   We waited, prepped and draped, and after the numbing was complete, debrided the wound with a scrub brush from the operating room and blunt dissection using pickup and scissors until all of the ragged edges were removed. This included scrubbing out the beds of the fracture. Once this was done, I was able to find the distal stump of the extensor hallucis longus, but I could not find the proximal stump. There was a transverse tendon coming at a 45 degree angle from the second metatarsal and I tenodesed the EHL to the second transverse brevis tendon using 0 Vicryl suture. I then closed the wound with 2-0 nylon loose simple sutures to allow for drainage. The patient will have a planned return to the operating room for a repeat I and D and attempt EHL repair in the next 48 hours and will be admitted for 48 hours for IV antibiotics.       MD CONSTANTINE Gordillo / BRITNI  D: 01/23/2018 12:33     T: 01/23/2018 13:30  JOB #: [id number]

## 2018-01-23 NOTE — PERIOP NOTES
Called floor twice to get report, nurse unable to come to phone, gave report that the pt needs pre op wash and the preop checklist needs done before tomorrow.

## 2018-01-23 NOTE — PROGRESS NOTES
I have been asked by Dr. Maximo Galeano to take over care of this patient due to my expertise in foot and ankle surgery and the complex nature of the patient's orthopedic problem. I have discussed with the patient our next steps. I will plan on exploring the wound tomorrow with repeat incision and debridement of any nonviable tissue. I then attempt to transfer the 2nd toe extensor to the EHL distal stump if I cannot find the proximal stump or there is not enough length. I would then take the 2nd extensor digitorum brevis tendon and route that into the stump of the 2nd long toe extensor distally. The patient understands this and wishes to proceed with surgical treatment. Because of the contamination of the injury, the patient will continue Ancef and gentamicin for at least 24 hr after final closure. I discussed surgical risks with him. Risks include bleeding, infection, wound healing day, failure of tendon transfer, development of deformity of the great toe and need for possible future surgery including but not limited to fusion of the great toe MTP joint. He understands this and wishes to proceed with surgery tomorrow.

## 2018-01-24 ENCOUNTER — ANESTHESIA (OUTPATIENT)
Dept: SURGERY | Age: 28
DRG: 501 | End: 2018-01-24
Payer: OTHER GOVERNMENT

## 2018-01-24 LAB
ANION GAP SERPL CALC-SCNC: 6 MMOL/L (ref 5–15)
BUN SERPL-MCNC: 7 MG/DL (ref 6–20)
BUN/CREAT SERPL: 8 (ref 12–20)
CALCIUM SERPL-MCNC: 8.6 MG/DL (ref 8.5–10.1)
CHLORIDE SERPL-SCNC: 105 MMOL/L (ref 97–108)
CO2 SERPL-SCNC: 29 MMOL/L (ref 21–32)
CREAT SERPL-MCNC: 0.84 MG/DL (ref 0.7–1.3)
GLUCOSE SERPL-MCNC: 95 MG/DL (ref 65–100)
POTASSIUM SERPL-SCNC: 3.7 MMOL/L (ref 3.5–5.1)
SODIUM SERPL-SCNC: 140 MMOL/L (ref 136–145)

## 2018-01-24 PROCEDURE — 74011000258 HC RX REV CODE- 258: Performed by: PHYSICIAN ASSISTANT

## 2018-01-24 PROCEDURE — 77030010509 HC AIRWY LMA MSK TELE -A: Performed by: REGISTERED NURSE

## 2018-01-24 PROCEDURE — 74011250636 HC RX REV CODE- 250/636: Performed by: ANESTHESIOLOGY

## 2018-01-24 PROCEDURE — 74011250636 HC RX REV CODE- 250/636: Performed by: PHYSICIAN ASSISTANT

## 2018-01-24 PROCEDURE — 0LQW0ZZ REPAIR LEFT FOOT TENDON, OPEN APPROACH: ICD-10-PCS | Performed by: ORTHOPAEDIC SURGERY

## 2018-01-24 PROCEDURE — 74011000250 HC RX REV CODE- 250: Performed by: ORTHOPAEDIC SURGERY

## 2018-01-24 PROCEDURE — 74011250636 HC RX REV CODE- 250/636

## 2018-01-24 PROCEDURE — 76210000035 HC AMBSU PH I REC 1 TO 1.5 HR: Performed by: ORTHOPAEDIC SURGERY

## 2018-01-24 PROCEDURE — 77030028224 HC PDNG CST BSNM -A: Performed by: ORTHOPAEDIC SURGERY

## 2018-01-24 PROCEDURE — 77030011640 HC PAD GRND REM COVD -A: Performed by: ORTHOPAEDIC SURGERY

## 2018-01-24 PROCEDURE — 65270000029 HC RM PRIVATE

## 2018-01-24 PROCEDURE — 36415 COLL VENOUS BLD VENIPUNCTURE: CPT | Performed by: PHYSICIAN ASSISTANT

## 2018-01-24 PROCEDURE — 77030008841 HC WRE K MCRA -A: Performed by: ORTHOPAEDIC SURGERY

## 2018-01-24 PROCEDURE — 77030003592 HC NDL KEITH ASPN -A: Performed by: ORTHOPAEDIC SURGERY

## 2018-01-24 PROCEDURE — 77030002933 HC SUT MCRYL J&J -A: Performed by: ORTHOPAEDIC SURGERY

## 2018-01-24 PROCEDURE — 74011000250 HC RX REV CODE- 250

## 2018-01-24 PROCEDURE — 77030002916 HC SUT ETHLN J&J -A: Performed by: ORTHOPAEDIC SURGERY

## 2018-01-24 PROCEDURE — 80048 BASIC METABOLIC PNL TOTAL CA: CPT | Performed by: PHYSICIAN ASSISTANT

## 2018-01-24 PROCEDURE — 76060000063 HC AMB SURG ANES 1.5 TO 2 HR: Performed by: ORTHOPAEDIC SURGERY

## 2018-01-24 PROCEDURE — 77030020255 HC SOL INJ LR 1000ML BG: Performed by: ORTHOPAEDIC SURGERY

## 2018-01-24 PROCEDURE — 74011250636 HC RX REV CODE- 250/636: Performed by: ORTHOPAEDIC SURGERY

## 2018-01-24 PROCEDURE — 77030003020 HC SUT TICRN COVD -A: Performed by: ORTHOPAEDIC SURGERY

## 2018-01-24 PROCEDURE — 76030000003 HC AMB SURG OR TIME 1.5 TO 2: Performed by: ORTHOPAEDIC SURGERY

## 2018-01-24 PROCEDURE — 77030021122 HC SPLNT MAT FST BSNM -A: Performed by: ORTHOPAEDIC SURGERY

## 2018-01-24 PROCEDURE — 74011250637 HC RX REV CODE- 250/637: Performed by: ORTHOPAEDIC SURGERY

## 2018-01-24 PROCEDURE — 74011250637 HC RX REV CODE- 250/637: Performed by: PHYSICIAN ASSISTANT

## 2018-01-24 PROCEDURE — 77030021352 HC CBL LD SYS DISP COVD -B: Performed by: ORTHOPAEDIC SURGERY

## 2018-01-24 PROCEDURE — 76210000050 HC AMBSU PH II REC 0.5 TO 1 HR: Performed by: ORTHOPAEDIC SURGERY

## 2018-01-24 PROCEDURE — 77030018836 HC SOL IRR NACL ICUM -A: Performed by: ORTHOPAEDIC SURGERY

## 2018-01-24 PROCEDURE — 0SHQ04Z INSERTION OF INTERNAL FIXATION DEVICE INTO LEFT TOE PHALANGEAL JOINT, OPEN APPROACH: ICD-10-PCS | Performed by: ORTHOPAEDIC SURGERY

## 2018-01-24 PROCEDURE — 77030000032 HC CUF TRNQT ZIMM -B: Performed by: ORTHOPAEDIC SURGERY

## 2018-01-24 RX ORDER — LIDOCAINE HYDROCHLORIDE 10 MG/ML
0.1 INJECTION, SOLUTION EPIDURAL; INFILTRATION; INTRACAUDAL; PERINEURAL AS NEEDED
Status: DISCONTINUED | OUTPATIENT
Start: 2018-01-24 | End: 2018-01-24 | Stop reason: HOSPADM

## 2018-01-24 RX ORDER — SODIUM CHLORIDE 0.9 % (FLUSH) 0.9 %
5-10 SYRINGE (ML) INJECTION EVERY 8 HOURS
Status: DISCONTINUED | OUTPATIENT
Start: 2018-01-24 | End: 2018-01-26 | Stop reason: HOSPADM

## 2018-01-24 RX ORDER — ONDANSETRON 2 MG/ML
INJECTION INTRAMUSCULAR; INTRAVENOUS AS NEEDED
Status: DISCONTINUED | OUTPATIENT
Start: 2018-01-24 | End: 2018-01-24 | Stop reason: HOSPADM

## 2018-01-24 RX ORDER — BUPIVACAINE HYDROCHLORIDE 5 MG/ML
INJECTION, SOLUTION EPIDURAL; INTRACAUDAL AS NEEDED
Status: DISCONTINUED | OUTPATIENT
Start: 2018-01-24 | End: 2018-01-24 | Stop reason: HOSPADM

## 2018-01-24 RX ORDER — MIDAZOLAM HYDROCHLORIDE 1 MG/ML
INJECTION, SOLUTION INTRAMUSCULAR; INTRAVENOUS AS NEEDED
Status: DISCONTINUED | OUTPATIENT
Start: 2018-01-24 | End: 2018-01-24 | Stop reason: HOSPADM

## 2018-01-24 RX ORDER — SODIUM CHLORIDE 0.9 % (FLUSH) 0.9 %
5-10 SYRINGE (ML) INJECTION EVERY 8 HOURS
Status: DISCONTINUED | OUTPATIENT
Start: 2018-01-24 | End: 2018-01-24 | Stop reason: HOSPADM

## 2018-01-24 RX ORDER — CEFAZOLIN SODIUM/WATER 2 G/20 ML
SYRINGE (ML) INTRAVENOUS
Status: DISCONTINUED
Start: 2018-01-24 | End: 2018-01-24

## 2018-01-24 RX ORDER — SODIUM CHLORIDE 0.9 % (FLUSH) 0.9 %
5-10 SYRINGE (ML) INJECTION AS NEEDED
Status: DISCONTINUED | OUTPATIENT
Start: 2018-01-24 | End: 2018-01-26 | Stop reason: HOSPADM

## 2018-01-24 RX ORDER — FENTANYL CITRATE 50 UG/ML
INJECTION, SOLUTION INTRAMUSCULAR; INTRAVENOUS AS NEEDED
Status: DISCONTINUED | OUTPATIENT
Start: 2018-01-24 | End: 2018-01-24 | Stop reason: HOSPADM

## 2018-01-24 RX ORDER — HYDROMORPHONE HYDROCHLORIDE 2 MG/ML
0.5 INJECTION, SOLUTION INTRAMUSCULAR; INTRAVENOUS; SUBCUTANEOUS
Status: DISCONTINUED | OUTPATIENT
Start: 2018-01-24 | End: 2018-01-26 | Stop reason: HOSPADM

## 2018-01-24 RX ORDER — SODIUM CHLORIDE, SODIUM LACTATE, POTASSIUM CHLORIDE, CALCIUM CHLORIDE 600; 310; 30; 20 MG/100ML; MG/100ML; MG/100ML; MG/100ML
25 INJECTION, SOLUTION INTRAVENOUS CONTINUOUS
Status: DISCONTINUED | OUTPATIENT
Start: 2018-01-24 | End: 2018-01-24 | Stop reason: HOSPADM

## 2018-01-24 RX ORDER — CEFAZOLIN SODIUM 1 G/3ML
INJECTION, POWDER, FOR SOLUTION INTRAMUSCULAR; INTRAVENOUS AS NEEDED
Status: DISCONTINUED | OUTPATIENT
Start: 2018-01-24 | End: 2018-01-24 | Stop reason: HOSPADM

## 2018-01-24 RX ORDER — VENLAFAXINE 37.5 MG/1
37.5 TABLET ORAL
Status: DISCONTINUED | OUTPATIENT
Start: 2018-01-24 | End: 2018-01-26 | Stop reason: HOSPADM

## 2018-01-24 RX ORDER — DIPHENHYDRAMINE HYDROCHLORIDE 50 MG/ML
12.5 INJECTION, SOLUTION INTRAMUSCULAR; INTRAVENOUS AS NEEDED
Status: DISCONTINUED | OUTPATIENT
Start: 2018-01-24 | End: 2018-01-24 | Stop reason: HOSPADM

## 2018-01-24 RX ORDER — OXYCODONE AND ACETAMINOPHEN 5; 325 MG/1; MG/1
1 TABLET ORAL
Status: DISCONTINUED | OUTPATIENT
Start: 2018-01-24 | End: 2018-01-24 | Stop reason: HOSPADM

## 2018-01-24 RX ORDER — HYDROMORPHONE HYDROCHLORIDE 2 MG/ML
INJECTION, SOLUTION INTRAMUSCULAR; INTRAVENOUS; SUBCUTANEOUS AS NEEDED
Status: DISCONTINUED | OUTPATIENT
Start: 2018-01-24 | End: 2018-01-24 | Stop reason: HOSPADM

## 2018-01-24 RX ORDER — PROPOFOL 10 MG/ML
INJECTION, EMULSION INTRAVENOUS AS NEEDED
Status: DISCONTINUED | OUTPATIENT
Start: 2018-01-24 | End: 2018-01-24 | Stop reason: HOSPADM

## 2018-01-24 RX ORDER — ACETAMINOPHEN 10 MG/ML
INJECTION, SOLUTION INTRAVENOUS
Status: COMPLETED
Start: 2018-01-24 | End: 2018-01-24

## 2018-01-24 RX ORDER — LIDOCAINE HYDROCHLORIDE 20 MG/ML
INJECTION, SOLUTION EPIDURAL; INFILTRATION; INTRACAUDAL; PERINEURAL AS NEEDED
Status: DISCONTINUED | OUTPATIENT
Start: 2018-01-24 | End: 2018-01-24 | Stop reason: HOSPADM

## 2018-01-24 RX ORDER — MORPHINE SULFATE 10 MG/ML
2 INJECTION, SOLUTION INTRAMUSCULAR; INTRAVENOUS
Status: DISCONTINUED | OUTPATIENT
Start: 2018-01-24 | End: 2018-01-24 | Stop reason: HOSPADM

## 2018-01-24 RX ORDER — FENTANYL CITRATE 50 UG/ML
25 INJECTION, SOLUTION INTRAMUSCULAR; INTRAVENOUS
Status: DISCONTINUED | OUTPATIENT
Start: 2018-01-24 | End: 2018-01-24 | Stop reason: HOSPADM

## 2018-01-24 RX ORDER — ACETAMINOPHEN 10 MG/ML
1000 INJECTION, SOLUTION INTRAVENOUS
Status: COMPLETED | OUTPATIENT
Start: 2018-01-24 | End: 2018-01-24

## 2018-01-24 RX ORDER — SODIUM CHLORIDE 0.9 % (FLUSH) 0.9 %
5-10 SYRINGE (ML) INJECTION AS NEEDED
Status: DISCONTINUED | OUTPATIENT
Start: 2018-01-24 | End: 2018-01-24 | Stop reason: HOSPADM

## 2018-01-24 RX ORDER — HYDROMORPHONE HYDROCHLORIDE 1 MG/ML
.2-.5 INJECTION, SOLUTION INTRAMUSCULAR; INTRAVENOUS; SUBCUTANEOUS ONCE
Status: DISCONTINUED | OUTPATIENT
Start: 2018-01-24 | End: 2018-01-24 | Stop reason: HOSPADM

## 2018-01-24 RX ADMIN — HYDROMORPHONE HYDROCHLORIDE 4 MG: 2 TABLET ORAL at 16:31

## 2018-01-24 RX ADMIN — FENTANYL CITRATE 50 MCG: 50 INJECTION, SOLUTION INTRAMUSCULAR; INTRAVENOUS at 12:12

## 2018-01-24 RX ADMIN — GENTAMICIN SULFATE 400 MG: 40 INJECTION, SOLUTION INTRAMUSCULAR; INTRAVENOUS at 18:22

## 2018-01-24 RX ADMIN — HYDROMORPHONE HYDROCHLORIDE 0.5 MG: 2 INJECTION INTRAMUSCULAR; INTRAVENOUS; SUBCUTANEOUS at 18:59

## 2018-01-24 RX ADMIN — ACETAMINOPHEN 1000 MG: 325 TABLET, FILM COATED ORAL at 18:21

## 2018-01-24 RX ADMIN — Medication 10 ML: at 23:22

## 2018-01-24 RX ADMIN — HYDROMORPHONE HYDROCHLORIDE 4 MG: 2 TABLET ORAL at 21:11

## 2018-01-24 RX ADMIN — ONDANSETRON 4 MG: 2 INJECTION INTRAMUSCULAR; INTRAVENOUS at 05:00

## 2018-01-24 RX ADMIN — MIDAZOLAM HYDROCHLORIDE 2 MG: 1 INJECTION, SOLUTION INTRAMUSCULAR; INTRAVENOUS at 12:08

## 2018-01-24 RX ADMIN — HYDROMORPHONE HYDROCHLORIDE 4 MG: 2 TABLET ORAL at 00:41

## 2018-01-24 RX ADMIN — Medication 10 ML: at 16:32

## 2018-01-24 RX ADMIN — HYDROMORPHONE HYDROCHLORIDE 4 MG: 2 TABLET ORAL at 05:52

## 2018-01-24 RX ADMIN — HYDROMORPHONE HYDROCHLORIDE 4 MG: 2 TABLET ORAL at 10:35

## 2018-01-24 RX ADMIN — CEFAZOLIN SODIUM 2 G: 1 INJECTION, POWDER, FOR SOLUTION INTRAMUSCULAR; INTRAVENOUS at 12:13

## 2018-01-24 RX ADMIN — SODIUM CHLORIDE, SODIUM LACTATE, POTASSIUM CHLORIDE, AND CALCIUM CHLORIDE 25 ML/HR: 600; 310; 30; 20 INJECTION, SOLUTION INTRAVENOUS at 11:24

## 2018-01-24 RX ADMIN — ACETAMINOPHEN 1000 MG: 325 TABLET, FILM COATED ORAL at 23:46

## 2018-01-24 RX ADMIN — PROPOFOL 200 MG: 10 INJECTION, EMULSION INTRAVENOUS at 12:12

## 2018-01-24 RX ADMIN — HYDROMORPHONE HYDROCHLORIDE 0.5 MG: 2 INJECTION INTRAMUSCULAR; INTRAVENOUS; SUBCUTANEOUS at 04:53

## 2018-01-24 RX ADMIN — ONDANSETRON 4 MG: 2 INJECTION INTRAMUSCULAR; INTRAVENOUS at 13:15

## 2018-01-24 RX ADMIN — ACETAMINOPHEN 1000 MG: 10 INJECTION, SOLUTION INTRAVENOUS at 14:38

## 2018-01-24 RX ADMIN — HYDROMORPHONE HYDROCHLORIDE 0.5 MG: 2 INJECTION, SOLUTION INTRAMUSCULAR; INTRAVENOUS; SUBCUTANEOUS at 12:45

## 2018-01-24 RX ADMIN — FENTANYL CITRATE 50 MCG: 50 INJECTION, SOLUTION INTRAMUSCULAR; INTRAVENOUS at 12:10

## 2018-01-24 RX ADMIN — Medication 2 G: at 05:52

## 2018-01-24 RX ADMIN — Medication 10 ML: at 05:03

## 2018-01-24 RX ADMIN — VENLAFAXINE 37.5 MG: 37.5 TABLET ORAL at 09:11

## 2018-01-24 RX ADMIN — HYDROMORPHONE HYDROCHLORIDE 0.5 MG: 2 INJECTION INTRAMUSCULAR; INTRAVENOUS; SUBCUTANEOUS at 09:09

## 2018-01-24 RX ADMIN — LIDOCAINE HYDROCHLORIDE 40 MG: 20 INJECTION, SOLUTION EPIDURAL; INFILTRATION; INTRACAUDAL; PERINEURAL at 12:12

## 2018-01-24 RX ADMIN — FENTANYL CITRATE 25 MCG: 50 INJECTION, SOLUTION INTRAMUSCULAR; INTRAVENOUS at 14:30

## 2018-01-24 NOTE — PROGRESS NOTES
1/23/2018  11:04 PM    Call to Ortho on call with request to restart IV pain medication; zofran; and IV fluids for NPO status.

## 2018-01-24 NOTE — ANESTHESIA POSTPROCEDURE EVALUATION
Post-Anesthesia Evaluation and Assessment    Patient: Azalia Morales MRN: [id number]  SSN: [id number]    YOB: 1990  Age: 28 y.o. Sex: male       Cardiovascular Function/Vital Signs  Visit Vitals    BP (!) 157/92 (BP 1 Location: Right arm, BP Patient Position: At rest)    Pulse 68    Temp 36.6 °C (97.9 °F)    Resp 18    Ht 5' 11\" (1.803 m)    Wt 88.5 kg (195 lb)    SpO2 96%    BMI 27.2 kg/m2       Patient is status post general anesthesia for Procedure(s):  INCISION AND DEBRIDEMENT OF OPEN WOUND, EXPLORATORY OF FOOT, POSSIBLE PRIMARY TENDON REPAIR  (GENERAL & LOCAL)percutaneous pinning of great toe. Nausea/Vomiting: None    Postoperative hydration reviewed and adequate. Pain:  Pain Scale 1: Numeric (0 - 10) (01/24/18 1502)  Pain Intensity 1: 1 (01/24/18 1502)   Managed    Neurological Status:   Neuro (WDL): Within Defined Limits (01/24/18 1502)  Neuro  Neurologic State: Drowsy; Eyes open spontaneously (01/24/18 1431)  Orientation Level: Oriented X4 (01/23/18 2020)  Cognition: Appropriate decision making; Appropriate for age attention/concentration; Appropriate safety awareness; Follows commands (01/23/18 2020)  Speech: Clear (01/23/18 2020)  LLE Motor Response: Withdraws; Other(comment) (01/23/18 2020)   At baseline    Mental Status and Level of Consciousness: Arousable    Pulmonary Status:   O2 Device: Room air (01/24/18 1502)   Adequate oxygenation and airway patent    Complications related to anesthesia: None    Post-anesthesia assessment completed.  No concerns    Signed By: Sebastien Jacobson MD     January 24, 2018

## 2018-01-24 NOTE — ANESTHESIA PREPROCEDURE EVALUATION
Anesthetic History   No history of anesthetic complications            Review of Systems / Medical History  Patient summary reviewed, nursing notes reviewed and pertinent labs reviewed    Pulmonary          Smoker (1/4 ppd)         Neuro/Psych         Psychiatric history (depression)     Cardiovascular  Within defined limits                Exercise tolerance: >4 METS     GI/Hepatic/Renal  Within defined limits              Endo/Other  Within defined limits           Other Findings   Comments: Von Willebrand's dz; very mild.  Has never had bleeding problems with surgical procedures           Physical Exam    Airway  Mallampati: I  TM Distance: 4 - 6 cm  Neck ROM: normal range of motion   Mouth opening: Normal    Comments: Full beard Cardiovascular    Rhythm: regular  Rate: normal      Pertinent negatives: No murmur   Dental      Comments: Multiple chips  crowns   Pulmonary  Breath sounds clear to auscultation               Abdominal  GI exam deferred       Other Findings            Anesthetic Plan    ASA: 2  Anesthesia type: general          Induction: Intravenous  Anesthetic plan and risks discussed with: Patient

## 2018-01-24 NOTE — PERIOP NOTES
Demond Garcia  1990  [id number]    Situation:  Verbal report given from: Fatuma Stephens CRNA, Matty Valero RN  Procedure: Procedure(s):  INCISION AND DEBRIDEMENT OF OPEN WOUND, EXPLORATORY OF FOOT, POSSIBLE PRIMARY TENDON REPAIR  (GENERAL & LOCAL)percutaneous pinning of great toe    Background:    Preoperative diagnosis: OPEN LEFT GREAT TOE FRACTURE     Postoperative diagnosis: OPEN LEFT GREAT TOE FRACTURE     :  Dr. Monet Roberts    Assistant(s): Circ-1: Faizan Wilcox RN  Circ-Relief: Amira Michaels RN  Scrub Tech-1: Linda Soulier.  Ashley    Specimens: * No specimens in log *    Assessment:  Intra-procedure medications         Anesthesia gave intra-procedure sedation and medications, see anesthesia flow sheet     Intravenous fluids: LR@ KVO     Vital signs stable       Recommendation:    Permission to share finding with family or friend yes

## 2018-01-24 NOTE — PROGRESS NOTES
Problem: Lower Extremity Wound Care  Goal: Interventions  Outcome: Progressing Towards Goal  ABX given; I&D scheduled 1/24/2018; dressing CDI; pain medication given, nutrition WNL; PWB at this point in time.

## 2018-01-24 NOTE — PERIOP NOTES
Pt. Resting quietly. Wakes up, states pain is 8/10 to toes, then drifts off to sleep. Vss. Will monitor. 1430-Pt. States pain to toes is 10/10. Medicated w/fentanyl 25mcg iv, notified Dr. Murtaza Zuniga. Orders received for iv tylenol. 1438-Medicated w/1000mg iv tylenol. Will monitor. 1455-Pt. States pain is better, now 5/10. States is tolerable. 1515-Pt. States pain is now 1/10. Will monitor.

## 2018-01-24 NOTE — BRIEF OP NOTE
BRIEF OPERATIVE NOTE    Date of Procedure: 1/24/2018   Preoperative Diagnosis: OPEN LEFT GREAT TOE FRACTURE, EHL and EHB laceration  Postoperative Diagnosis: OPEN LEFT GREAT TOE FRACTURE , EHL and EHB laceration  Procedure(s):  INCISION AND DEBRIDEMENT OF OPEN WOUND, EXPLORATORY OF FOOT, PRIMARY TENDON REPAIR of EHL and EHB (GENERAL & LOCAL)percutaneous pinning of great toe    Surgeon(s) and Role:     * Eran Kovacs MD - Primary         Assistant Staff: None      Surgical Staff:  Circ-1: Jaswant Barnett RN  Circ-Relief: Vanessa Gautam RN  Scrub Tech-1: Chelsea Ramirez  Event Time In   Incision Start 1243   Incision Close 1336     Anesthesia: General   Estimated Blood Loss: < 10 cc  Specimens: * No specimens in log *   Findings: see full note   Complications: none  Implants:   Implant Name Type Inv.  Item Serial No.  Lot No. LRB No. Used Action   0.062 k-wire     NA   [id number] Left 1 Implanted

## 2018-01-24 NOTE — PERIOP NOTES
TRANSFER - IN REPORT:    Verbal report received from Ronald Ruiz on [facility]  being received from [phone] for ordered procedure      Report consisted of patients Situation, Background, Assessment and   Recommendations(SBAR). Information from the following report(s) Kardex was reviewed with the receiving nurse. Opportunity for questions and clarification was provided. She stated pt has had wipe down twice and checklist at that time was not complete.

## 2018-01-24 NOTE — PERIOP NOTES
Pt. Transported to room [phone] via wheelchair. Vss. States pain is 1/10, states is tolerable. Dressing to left foot intact. Lungs clear. Denies nausea. Caregiver at bedside. Bedside report given to Frances Pope. Went over medications received for pain.

## 2018-01-24 NOTE — PERIOP NOTES
TRANSFER - OUT REPORT:    Verbal report given to Shahbaz(name) on Jaun Olvera  being transferred to [facility](unit) for routine progression of care       Report consisted of patients Situation, Background, Assessment and   Recommendations(SBAR). Information from the following report(s) SBAR, OR Summary, Intake/Output and MAR was reviewed with the receiving nurse. Lines:   Peripheral IV 01/22/18 Left Antecubital (Active)   Site Assessment Clean, dry, & intact 1/24/2018  3:02 PM   Phlebitis Assessment 0 1/24/2018  3:02 PM   Infiltration Assessment 0 1/24/2018  3:02 PM   Dressing Status Clean, dry, & intact 1/24/2018  3:02 PM   Dressing Type Tape;Transparent 1/24/2018  3:02 PM   Hub Color/Line Status Green; Infusing 1/24/2018  3:02 PM        Opportunity for questions and clarification was provided.       Patient transported with:   Registered Nurse

## 2018-01-24 NOTE — PROGRESS NOTES
Pharmacy Automatic Renal Dosing Protocol - Antimicrobials    Indication for Antimicrobials: Chainsaw injury to foot; s/p I/D     Current Regimen of Each Antimicrobial:  Cefazolin 2 gm every 8 hours for 48 hours (Start Date ; Day # 2)  Gentamicin 5mg/kg every 24 hours - pulse dosing (, day 2)    Goal Level: PULSE DOSING GENTAMICIN  Peak: 15 - 20 mcg/mL  Trough: < or = 0.3 mcg/mL    Measured  Gentamicin Level:    @ 16:01:   < 0.2     Significant Cultures:   none    Paralysis, amputations, malnutrition:     Labs:  Recent Labs      18   0410  18   1415   CREA  0.94  1.29   BUN  17  19   WBC  11.8*  8.8     Temp (24hrs), Av.1 °F (36.7 °C), Min:97.8 °F (36.6 °C), Max:98.4 °F (36.9 °C)      Creatinine Clearance (mL/min) or Dialysis: >100  No results found for: PCT    Impression/Plan:   - Random level <0.2 is within the desired range for pulse dosing regimen.  - Continue Gentamicin 400mg IV q24h  - Daily BMP  - Cefazolin to end after 48h as ordered      Pharmacy will follow daily and adjust medications as appropriate for renal function and/or serum levels. Thank you,  He Bello [facility]          Recommended duration of therapy  [email or web address]/[place]/[facility]/Pharmacy/Clinical%20Companion/Duration%20of%20ABX%20therapy. docx    Renal Dosing  [email or web address]/[place]/[facility]/Pharmacy/Clinical%20Companion/Renal%[id number]. pdf

## 2018-01-24 NOTE — OP NOTES
DATE: 1/24/18    Preoperative Diagnosis:  left lower extremity:  1. Open, Grade II, fracture 1st metatarsal fracture  2. Laceration Extensor Hallucis longus tendon  3. Laceration Extensor Hallucis brevis tendon  4. Chainsaw injury to foot    Postoperative Diagnosis:  Same    Procedure:  1. Debridement including removal of foreign material outside of an open fracture; skin, subcutaneous tissue, muscle, fascia and bone (34919)  2. Primary repair of Extensor Hallucis longus tendon (59396)  3. Primary repair of extensor hallucis brevis tendon (25692)  4. Complex repair of traumatic foot wound (32718)    Surgeon:   Cait García MD    Anesthesia:  General  Ankle Nerve block with 0.5% plain marcaine    EBL:  < 10 cc    Complications:  none    Specimen:  none    Implants:  none     HPI/Indication:   48-year-old male with history of traumatic injury to his left foot. He had been using a chain saw to cut [place]. He reportedly put the chain saw through the distal and medial aspect of his left foot. He was originally taken to the operating room urgently by Dr. Sebastián Joe for an Initial  Incision and debridement. During this initial surgery, Dr. Sebastián Joe reported that the EHL and each be tendons were completely lacerated and could not be found in the foot wound. He requested that I take over care of the patient due to  My expertise in foot and ankle surgery and because of his complex injury. He required planned return to the operating room 2 days later for re-exploration of the wound, repeat incisional  Debridement of the open fracture and exploration of foot wound. He then would require either primary repair of the lacerated tendons if possible versus tendon transfer. Risks and benefits were discussed with the patient.   Risks included but were not limited to infection, bleeding, nerve injury, failure to primarily repair the tendons, failure of a tendon transfer, failure of tendon repair if performed, development of great toe deformity and chronic pain. Additionally, he may require  Future surgery if infection should occur or if he develops toe deformity. He may require fusion of his great toe. The patient understood this and wished to continue. Procedure: The patient arrived at the surgical center. Consent was obtained for the surgical procedure, as well as the Anesthetic. The correct surgical limb was identified and marked by the patient and myself. The patient was then brought to the Operating suite, where a time-out was performed which identified the patient's identity, the surgical site, and the surgical procedure. An SCD was placed on the nonoperative leg for DVT prophylaxis. The patient had been receiving around the clock antibiotics due to his open fracture. The patient was prepped and draped in standard, sterile fashion. Once completely draped, another time-out was performed identifying all pertinent information. The prior nylon sutures were removed. The wound was then opened for exploration. The wound was copiously irrigated with 1 L of Sterile normal saline. Debridement was carried out in and excisional fashion by sharp cutting for any necrotic, contaminated and infected appearing tissue. This included skin, subcutaneous tissue, musle, fat and bone until healthy appearing tissues with bleeding edges were obtained. In total, a 4 cm by 3 cm area along the traumatic wound was debrided. The lateral most extent of the transverse wound  Was then extended proximally between the 1st and 2nd metatarsal to allow for exploration and to find the extensor hallucis longus and brevis. Both tendons were found completely lacerated and retracted approximately 1 cm proximal to the wound. The tendon edges were found to be slightly contaminated  With dirt debris and the edges were ragged.   These were freshened up with fresh cuts and all dirt debris was copiously lavaged from the wound.  The distal stump was then found at the base of the proximal phalanx. Both the EHL and EHB tendons were individually identified. 3-0 Ticron suture was used to repair the EHL and the EHB primarily in an end-to-end fashion. This was performed with the ankle dorsiflexed and the toe extended. The repair was then reinforced with 4-0 Monocryl suture. There was a small amount of tension on the tendons. Therefore, it was deemed necessary to perform a percutaneous retrograde pinning of the great toe joint to act as an internal splint and to avoid unintentional toe flexion which could damage the repair. A 0.062 K-wire was placed from the tip of the great toe, retrograde across the IP joint and then into the metatarsal head. The location was verified on orthogonal views. The wound was then again irrigated. The wound edges were found to be irregular due to the traumatic nature. The edges were then taken back to healthy viable tissue with use of a 15 blade. The wound was then approximated primarily with 3-0 nylon suture. A sterile dressing of xeroform, 4x4s and sterile webril was applied. Drapes were then removed and a plaster splint was applied to prevent the ankle from moving and pulling on the repair.       Plan:  - Weightbearing: non-weight bearing left lower extremity  - Keep dressing clean and dry  - Elevate extremity just above level of heart  - Pain control  - DVT ppx  - Dispo: Return to room; will require 24 hours of abx now that wound is clean and closed

## 2018-01-25 VITALS
HEIGHT: 71 IN | RESPIRATION RATE: 18 BRPM | WEIGHT: 195 LBS | SYSTOLIC BLOOD PRESSURE: 146 MMHG | HEART RATE: 75 BPM | BODY MASS INDEX: 27.3 KG/M2 | DIASTOLIC BLOOD PRESSURE: 71 MMHG | OXYGEN SATURATION: 99 % | TEMPERATURE: 98.4 F

## 2018-01-25 PROCEDURE — G8979 MOBILITY GOAL STATUS: HCPCS

## 2018-01-25 PROCEDURE — 74011000258 HC RX REV CODE- 258: Performed by: PHYSICIAN ASSISTANT

## 2018-01-25 PROCEDURE — 74011250636 HC RX REV CODE- 250/636: Performed by: ORTHOPAEDIC SURGERY

## 2018-01-25 PROCEDURE — 97161 PT EVAL LOW COMPLEX 20 MIN: CPT

## 2018-01-25 PROCEDURE — G8978 MOBILITY CURRENT STATUS: HCPCS

## 2018-01-25 PROCEDURE — 97116 GAIT TRAINING THERAPY: CPT

## 2018-01-25 PROCEDURE — 65270000029 HC RM PRIVATE

## 2018-01-25 PROCEDURE — G8980 MOBILITY D/C STATUS: HCPCS

## 2018-01-25 PROCEDURE — 74011250637 HC RX REV CODE- 250/637: Performed by: ORTHOPAEDIC SURGERY

## 2018-01-25 PROCEDURE — 74011250636 HC RX REV CODE- 250/636: Performed by: PHYSICIAN ASSISTANT

## 2018-01-25 PROCEDURE — 74011250637 HC RX REV CODE- 250/637: Performed by: PHYSICIAN ASSISTANT

## 2018-01-25 RX ORDER — HYDROMORPHONE HYDROCHLORIDE 2 MG/1
2 TABLET ORAL
Qty: 40 TAB | Refills: 0 | Status: SHIPPED | OUTPATIENT
Start: 2018-01-25

## 2018-01-25 RX ORDER — CEFAZOLIN SODIUM/WATER 2 G/20 ML
2 SYRINGE (ML) INTRAVENOUS EVERY 8 HOURS
Status: COMPLETED | OUTPATIENT
Start: 2018-01-25 | End: 2018-01-26

## 2018-01-25 RX ADMIN — HYDROMORPHONE HYDROCHLORIDE 4 MG: 2 TABLET ORAL at 14:55

## 2018-01-25 RX ADMIN — HYDROMORPHONE HYDROCHLORIDE 4 MG: 2 TABLET ORAL at 23:59

## 2018-01-25 RX ADMIN — Medication 10 ML: at 16:06

## 2018-01-25 RX ADMIN — Medication 10 ML: at 23:59

## 2018-01-25 RX ADMIN — Medication 2 G: at 23:59

## 2018-01-25 RX ADMIN — Medication 10 ML: at 06:59

## 2018-01-25 RX ADMIN — Medication 2 G: at 16:06

## 2018-01-25 RX ADMIN — ACETAMINOPHEN 1000 MG: 325 TABLET, FILM COATED ORAL at 06:31

## 2018-01-25 RX ADMIN — HYDROMORPHONE HYDROCHLORIDE 4 MG: 2 TABLET ORAL at 20:01

## 2018-01-25 RX ADMIN — HYDROMORPHONE HYDROCHLORIDE 4 MG: 2 TABLET ORAL at 04:07

## 2018-01-25 RX ADMIN — VENLAFAXINE 37.5 MG: 37.5 TABLET ORAL at 09:38

## 2018-01-25 RX ADMIN — GENTAMICIN SULFATE 400 MG: 40 INJECTION, SOLUTION INTRAMUSCULAR; INTRAVENOUS at 17:33

## 2018-01-25 RX ADMIN — ACETAMINOPHEN 1000 MG: 325 TABLET, FILM COATED ORAL at 23:59

## 2018-01-25 RX ADMIN — ACETAMINOPHEN 1000 MG: 325 TABLET, FILM COATED ORAL at 17:34

## 2018-01-25 NOTE — DISCHARGE INSTRUCTIONS
Mariana Meyers MD  Postoperative Instructions    Right/Left Lower Extremity:   ___Non Weight Bearing    ___Postop Shoe   ___Heel touch weightbearing               ___CAM Boot   _X__Weightbearing as tolerated   ___Splint/Cast    Dressing:   _X__Keep Dressing or Splint clean & dry   _X__Do Not remove dressing; Dressing will be changed at first postoperative visit.   ___Other:      [place]ast:   You may shower 48 hours after your surgery. Do Not allow dressing or splint to get wet! Diet:    Advance diet as tolerated. You may experience nausea due to anesthesia, pain or pain medications. You should avoid spicy or heavy meals initially. Pain Management:   If you have received a block, the pain relief can last from 4-24 hours. This also means you may not have sensation or movement in your foot for the same amount of time.  You will have pain after the block wears off. Anticipate this and start pain medications prior to the block wearing off.  Do Not drive while taking narcotic pain medications. Elevation:   Strict elevation at or just above the level of your heart for the first 14 days after surgery. Limit time that foot is in dependent position for trips to bathroom and kitchen as much as possible. Early control of swelling will improve future swelling. Ice:    __X___ Shiro Rochelle may ice your surgical extremity for no longer than 20 minutes at a time, 3-4 times per day. Do Not apply ice directly to the skin.  _____ Do Not apply ice to surgical extremity. ELEVATE THE LEFT LEG TO HELP WITH PAIN AND SWELLING    Call our office at [phone] if the following occur:   Severe swelling, profuse bleeding or severe pain which does not improve with pain medication.  Fever greater than 101.0; fevers less than this are very common in the first few days after surgery and are unlikely to indicate infection. Follow up:  In 2 weeks

## 2018-01-25 NOTE — PROGRESS NOTES
physical Therapy EVALUATION/DISCHARGE  Patient: Donnie Maldonado (25 y.o. male)  Date: 1/25/2018  Primary Diagnosis: Laceration of extensor hallucis longus tendon  OPEN LEFT GREAT TOE FRACTURE   Procedure(s) (LRB):  INCISION AND DEBRIDEMENT OF OPEN WOUND, EXPLORATORY OF FOOT, POSSIBLE PRIMARY TENDON REPAIR  (GENERAL & LOCAL)percutaneous pinning of great toe (Left) 1 Day Post-Op   Precautions:  Fall, NWB  ASSESSMENT :  Based on the objective data described below, the patient presents with impaired functional mobility secondary to LLE NWB precautions, impaired standing balance, impaired gait mechanics, and decreased activity tolerance POD# 1 I+D L foot with possible tendon repair. Pt received supine on couch in room and agreeable to PT evaluation. Pt cleared by nursing for mobility. Reported no pain at rest or with mobility. Educated pt on NWB precautions and safe use of HARIS axillary crutches. Bed mobility performed independently. Sit<>stand transfers performed with supervision with use of HARIS axillary crutches. He ambulated 120' with CGA x 1 using HARIS axillary crutches, demonstrating hop-to gait pattern on R. With steady gait overall and no overt LOB noted. He ascended/descended 4 steps with CGA x 1 using HARIS railings. Pt with good adherence to LLE NWB precautions throughout gait. Pt reports that he is planning to stay on the first floor of his home and his SO will assist at discharge and has crutches that he can use. Educated pt on adjusting the crutches to his height. Pt was left sitting on couch in room with all needs met and RN aware. Pt is cleared to discharge home with use of HARIS axillary crutches and SO support at this time. No further skilled acute PT warranted. Will sign off. Skilled physical therapy is not indicated at this time.      PLAN :  Discharge Recommendations: None  Further Equipment Recommendations for Discharge: crutches - pt states his SO has crutches he can use     SUBJECTIVE:   Patient stated No pain.    OBJECTIVE DATA SUMMARY:   HISTORY:    Past Medical History:   Diagnosis Date    Depression     Psychiatric disorder     Von Willebrand disease ([facility] [address]      Past Surgical History:   Procedure Laterality Date    HX ORTHOPAEDIC      calf, eduardo     Prior Level of Function/Home Situation: Pt is independent for mobility and ADLs, Drives. Works. Lives with SO and reports that she will be able to provide support at discharge. Denies fall history. Injury occurred from chainsaw accident. Personal factors and/or comorbidities impacting plan of care: n/a    Home Situation  Home Environment: Private residence  # Steps to Enter: 3  Rails to Enter: Yes  Hand Rails : Bilateral  One/Two Story Residence: Two story  # of Interior Steps: 13  Living Alone: No  Support Systems: Spouse/Significant Other/Partner  Patient Expects to be Discharged to[de-identified] Private residence  Current DME Used/Available at Home: Crutches  Tub or Shower Type: Tub/Shower combination    EXAMINATION/PRESENTATION/DECISION MAKING:   Critical Behavior:  Neurologic State: Alert  Orientation Level: Oriented X4  Cognition: Appropriate decision making, Follows commands     Hearing: Auditory  Auditory Impairment: None  Skin:  L foot bandaged  Edema: L foot mild edema  Range Of Motion:  AROM: Within functional limits           PROM: Within functional limits           Strength:    Strength: Within functional limits                    Tone & Sensation:   Tone: Normal              Sensation: Intact               Coordination:  Coordination: Within functional limits  Vision:      Functional Mobility:  Bed Mobility:  Rolling: Independent  Supine to Sit: Independent  Sit to Supine: Independent  Scooting: Independent  Transfers:  Sit to Stand: Supervision  Stand to Sit: Supervision                       Balance:   Sitting: Intact  Standing: Intact; With support  Ambulation/Gait Training:  Distance (ft): 120 Feet (ft)  Assistive Device: Gait belt;Crutches  Ambulation - Level of Assistance: Contact guard assistance;Assist x1;Additional time; Adaptive equipment     Gait Description (WDL): Exceptions to WDL  Gait Abnormalities: Decreased step clearance; Step to gait  Right Side Weight Bearing: Full  Left Side Weight Bearing: Non-weight bearing        Speed/Alison: Pace decreased (<100 feet/min)  Step Length: Right shortened     Stairs:  Number of Stairs Trained: 4  Stairs - Level of Assistance: Contact guard assistance;Assist X1   Rail Use: Both       Functional Measure:  Barthel Index:    Bathin  Bladder: 10  Bowels: 10  Groomin  Dressing: 10  Feeding: 10  Mobility: 10  Stairs: 5  Toilet Use: 5  Transfer (Bed to Chair and Back): 10  Total: 75       Barthel and G-code impairment scale:  Percentage of impairment CH  0% CI  1-19% CJ  20-39% CK  40-59% CL  60-79% CM  80-99% CN  100%   Barthel Score 0-100 100 99-80 79-60 59-40 20-39 1-19   0   Barthel Score 0-20 20 17-19 13-16 9-12 5-8 1-4 0      The Barthel ADL Index: Guidelines  1. The index should be used as a record of what a patient does, not as a record of what a patient could do. 2. The main aim is to establish degree of independence from any help, physical or verbal, however minor and for whatever reason. 3. The need for supervision renders the patient not independent. 4. A patient's performance should be established using the best available evidence. Asking the patient, friends/relatives and nurses are the usual sources, but direct observation and common sense are also important. However direct testing is not needed. 5. Usually the patient's performance over the preceding 24-48 hours is important, but occasionally longer periods will be relevant. 6. Middle categories imply that the patient supplies over 50 per cent of the effort. 7. Use of aids to be independent is allowed. Jluis Price., Barthel, D.W. (1397). Functional evaluation: the Barthel Index. [facility] (14)2.   MIRELLA Enciso, Felicita Daigle., Marine Dias. (1999). Measuring the change indisability after inpatient rehabilitation; comparison of the responsiveness of the Barthel Index and Functional [place] Measure. Journal of Neurology, Neurosurgery, and Psychiatry, 66(4), 606-440. RUSS Flanagan.MIKE, FABIÁN Ring, & Erwin Silveira M.A. (2004.) Assessment of post-stroke quality of life in cost-effectiveness studies: The usefulness of the Barthel Index and the EuroQoL-5D. Quality of Life Research, 13, 514-15       G codes: In compliance with CMSs Claims Based Outcome Reporting, the following G-code set was chosen for this patient based on their primary functional limitation being treated: The outcome measure chosen to determine the severity of the functional limitation was the Barthel index with a score of 75/100 which was correlated with the impairment scale. ? Mobility - Walking and Moving Around:     - CURRENT STATUS: CJ - 20%-39% impaired, limited or restricted    - GOAL STATUS: CJ - 20%-39% impaired, limited or restricted    - D/C STATUS:  CJ - 20%-39% impaired, limited or restricted        Physical Therapy Evaluation Charge Determination   History Examination Presentation Decision-Making   LOW Complexity : Zero comorbidities / personal factors that will impact the outcome / POC HIGH Complexity : 4+ Standardized tests and measures addressing body structure, function, activity limitation and / or participation in recreation  LOW Complexity : Stable, uncomplicated  Other outcome measures barthel index  MEDIUM      Based on the above components, the patient evaluation is determined to be of the following complexity level: LOW     Pain:  Pain Scale 1: Numeric (0 - 10)  Pain Intensity 1: 0  Pain Location 1: Foot  Activity Tolerance:   Good - pt without complaints  Please refer to the flowsheet for vital signs taken during this treatment.   After treatment:   [x]   Patient left in no apparent distress sitting up in chair  []   Patient left in no apparent distress in bed  [x]   Call bell left within reach  [x]   Nursing notified  []   Caregiver present  []   Bed alarm activated    COMMUNICATION/EDUCATION:   Communication/Collaboration:  [x]   Fall prevention education was provided and the patient/caregiver indicated understanding. [x]   Patient/family have participated as able and agree with findings and recommendations. []   Patient is unable to participate in plan of care at this time.   Findings and recommendations were discussed with: Physical Therapist, Registered Nurse and NP    Thank you for this referral.  Rex Villegas, PT, DPT   Time Calculation: 18 mins

## 2018-01-25 NOTE — PROGRESS NOTES
Ortho / Neurosurgery NP Note    POD# 1  s/p INCISION AND DEBRIDEMENT OF OPEN WOUND, EXPLORATORY OF FOOT, POSSIBLE PRIMARY TENDON REPAIR  (GENERAL & LOCAL)percutaneous pinning of great toe   Pt seen with IDR team    Pt resting on couch. No complaints. States pain well controlled    VSS Afebrile. Voiding status: + void  Tolerating PO well    Labs  Lab Results   Component Value Date/Time    HGB 14.3 01/23/2018 04:10 AM      No results found for: INR     Body mass index is 27.2 kg/(m^2). : A BMI > 30 is classified as obesity and > 40 is classified as morbid obesity. Bulky Dressing c.d.i  Cryotherapy in place over incision  Calves soft and supple; No pain with passive stretch  Sensation and motor intact  SCDs for mechanical DVT proph while in bed     PLAN:  1) PT BID  2) Abx - receiving Ancef since pre-op. 3) Pain control - PRN Dilaudid. 4) Plan d/c after completing of 24 hr abx per Dr. Ashleigh Razo. Noemi Parikh NP  DNP, ACNP-BC, ONP-C      Addendum -   Spoke with Dr. Ashleigh Razo to clarify antibiotic orders. Pt should be getting two more doses of Ancef. Will order now. Pt has a ride home tomorrow morning by 6 am.  Dr. Ashleigh Razo ok with discharge at 6 am.  No need for further PO abx at home and pt is to follow up in two weeks.     Noemi Parihk NP

## 2018-01-25 NOTE — INTERDISCIPLINARY ROUNDS
Bedside interdisciplinary rounds were held today to discuss patient plan of care and outcomes. The following members were present: Nurse Practitioner, Nurse, Clinical Care Leader, Pharmacy, Physical Therapy, and Case Management. Plan:  Continue IV antibiotics. Plan for discharge to home today vs tomorrow - Seeking clarification from MD regarding timeline.

## 2018-01-26 NOTE — PROGRESS NOTES
Problem: Falls - Risk of  Goal: *Absence of Falls  Document Kelechi Fall Risk and appropriate interventions in the flowsheet.    Outcome: Resolved/Met Date Met: 01/25/18  Fall Risk Interventions:  Mobility Interventions: Communicate number of staff needed for ambulation/transfer, Assess mobility with egress test         Medication Interventions: Teach patient to arise slowly, Evaluate medications/consider consulting pharmacy    Elimination Interventions: Call light in reach, Urinal in reach

## 2018-01-26 NOTE — PROGRESS NOTES
Discharge instructions reviewed with pt and his significant other. Prescription given for pain medication and reviewed Rx with pt.    Pt. Signed and given instructions and given a copy and 1 placed on chart

## 2018-01-26 NOTE — PROGRESS NOTES
S: no new complaints. Pain controlled. No cp, sob or calf pain.     O:  Visit Vitals    /71 (BP Patient Position: At rest)    Pulse 75    Temp 98.4 °F (36.9 °C)    Resp 18    Ht 5' 11\" (1.803 m)    Wt 88.5 kg (195 lb)    SpO2 99%    BMI 27.2 kg/m2       Left extremity:  splint C/D/I  Toes: pink with brisk cap refill; K-wire in place    A/P: POD1 s/p debridement, EHL and EHB repair  - pain control  - NWB RLE  - splint  - IV abx until tomorrow morning  - Patient to be discharged in morning to home; will follow up in 2 weeks for suture removal.

## 2018-01-26 NOTE — PROGRESS NOTES
Discharged with w/c thru ED exit with significant other.   Last dose of ancef given and pain medication given

## 2018-02-05 NOTE — DISCHARGE SUMMARY
Ortho Discharge Summary    Patient ID:  Ava Cross  [id number]  male  28 y.o.  1990    Admit date: 1/22/2018    Discharge date: 1/26/2018    Admitting Physician: Jyoti Hein MD , Dr. Brenna Chávez Physician(s):       Date of Surgery:   1/24/2018     Preoperative Diagnosis:  OPEN LEFT GREAT TOE FRACTURE     Postoperative Diagnosis:   OPEN LEFT GREAT TOE FRACTURE     Procedure(s):   left  INCISION AND DEBRIDEMENT OF OPEN WOUND, EXPLORATORY OF FOOT, POSSIBLE PRIMARY TENDON REPAIR  (GENERAL & LOCAL)percutaneous pinning of great toe     Anesthesia Type:   General     Surgeon: Cristopher Ryan MD                            HPI:  Pt is a 28 y.o. male who has a history of OPEN LEFT GREAT TOE FRACTURE   with pain and limitations of activities of daily living who presents at this time for a left great Toe I&D. PMH:   Past Medical History:   Diagnosis Date    Depression     Psychiatric disorder     Von Willebrand disease ([facility] [address]        Body mass index is 27.2 kg/(m^2). : A BMI > 30 is classified as obesity and > 40 is classified as morbid obesity. Medications upon admission :   Prior to Admission Medications   Prescriptions Last Dose Informant Patient Reported? Taking? DM/P-EPHED/ACETAMINOPH/DOXYLAM (NYQUIL PO) 1/15/2018 at Unknown time Self Yes Yes   Sig: Take  by mouth daily as needed for Cough ('congestion'). Patient takes 'one small cupful. '   DM/PSEUDOEPHED/ACETAMINOPH/CPM (THERAFLU MS SEVERE COLD &COUGH PO) 1/15/2018 at Unknown time Self Yes Yes   Sig: Take  by mouth daily as needed for Cough ('congestion'). Patient takes 'one small cupful. '   DM/PSEUDOEPHED/ACETAMINOPHEN (VICKS DAYQUIL PO) 1/15/2018 at Unknown time Self Yes Yes   Sig: Take  by mouth daily as needed for Cough ('congestion'). Patient takes 'one small cupful.'   busPIRone (BUSPAR) 5 mg tablet Not Taking at Unknown time Other Yes No   Sig: Take 5 mg by mouth three (3) times daily.    venlafaxine (EFFEXOR) 37.5 mg tablet 1/23/2018 at Unknown time Other Yes Yes   Sig: Take 37.5 mg by mouth daily. Facility-Administered Medications: None        Allergies:  No Known Allergies     Hospital Course: The patient underwent surgery. Complications:  None; patient tolerated the procedure well. Was taken to the PACU in stable condition and then transferred to the ortho floor. Perioperative Antibiotics:  Ancef  & Gentamycin    Postoperative Pain Management:  Dilaudid    Postoperative transfusions:    Number of units banked PRBCs =   none     Post Op complications: none    Hemoglobin at discharge:    Lab Results   Component Value Date/Time    HGB 14.3 01/23/2018 04:10 AM       Dressing remained clean, dry and intact. No significant erythema or swelling. Neurovascular exam found to be within normal limits. Wound appears to be healing without any evidence of infection. Ambulation started on the day following surgery and participated in bed mobility, transfers and ambulation. Gait:  Gait  Speed/Alison: Pace decreased (<100 feet/min)  Step Length: Right shortened  Gait Abnormalities: Decreased step clearance, Step to gait  Ambulation - Level of Assistance: Contact guard assistance, Assist x1, Additional time, Adaptive equipment  Distance (ft): 120 Feet (ft)  Assistive Device: Gait belt, Crutches  Rail Use: Both  Stairs - Level of Assistance: Contact guard assistance, Assist X1  Number of Stairs Trained: 4                   Discharged to: Home. Condition on Discharge:   stable    Discharge instructions:    - Take pain medications as prescribed  - Resume pre hospital diet      - Discharge activity: activity as tolerated  - Weight bearing status - NWB  - Wound Care Keep wound clean and dry.   See discharge instruction sheet.  - Staples to be removed 10 days after surgery            -DISCHARGE MEDICATION LIST     Discharge Medication List as of 1/25/2018  7:12 PM      START taking these medications    Details   HYDROmorphone (DILAUDID) 2 mg tablet Take 1 Tab by mouth every four (4) hours as needed. Max Daily Amount: 12 mg., Print, Disp-40 Tab, R-0         CONTINUE these medications which have NOT CHANGED    Details   venlafaxine (EFFEXOR) 37.5 mg tablet Take 37.5 mg by mouth daily. , Historical Med      DM/P-EPHED/ACETAMINOPH/DOXYLAM (NYQUIL PO) Take  by mouth daily as needed for Cough ('congestion'). Patient takes 'one small cupful.', Historical Med      DM/PSEUDOEPHED/ACETAMINOPHEN (VICKS DAYQUIL PO) Take  by mouth daily as needed for Cough ('congestion'). Patient takes 'one small cupful.', Historical Med      DM/PSEUDOEPHED/ACETAMINOPH/CPM (THERAFLU MS SEVERE COLD &COUGH PO) Take  by mouth daily as needed for Cough ('congestion'). Patient takes 'one small cupful.', Historical Med      busPIRone (BUSPAR) 5 mg tablet Take 5 mg by mouth three (3) times daily. , Historical Med          per medical continuation form      -Follow up in office in 1 week      Signed:  Ollen Canavan.  Kodak Zapien, AMRIK-BC, ONP-C  Orthopaedic Nurse Practitioner    2/5/2018  11:38 AM

## 2022-03-19 PROBLEM — S96.129A: Status: ACTIVE | Noted: 2018-01-22

## 2024-10-17 ENCOUNTER — HOSPITAL ENCOUNTER (EMERGENCY)
Facility: HOSPITAL | Age: 34
Discharge: HOME | End: 2024-10-17
Attending: EMERGENCY MEDICINE
Payer: OTHER GOVERNMENT

## 2024-10-17 VITALS
RESPIRATION RATE: 18 BRPM | WEIGHT: 220 LBS | TEMPERATURE: 97.5 F | HEART RATE: 79 BPM | SYSTOLIC BLOOD PRESSURE: 132 MMHG | OXYGEN SATURATION: 96 % | HEIGHT: 70 IN | DIASTOLIC BLOOD PRESSURE: 76 MMHG | BODY MASS INDEX: 31.5 KG/M2

## 2024-10-17 DIAGNOSIS — B35.6 TINEA CRURIS: ICD-10-CM

## 2024-10-17 DIAGNOSIS — L03.011 PARONYCHIA OF FINGER OF RIGHT HAND: Primary | ICD-10-CM

## 2024-10-17 PROCEDURE — 99284 EMERGENCY DEPT VISIT MOD MDM: CPT

## 2024-10-17 PROCEDURE — 90471 IMMUNIZATION ADMIN: CPT | Performed by: EMERGENCY MEDICINE

## 2024-10-17 PROCEDURE — 2500000003 HC RX 250 WO HCPCS: Performed by: EMERGENCY MEDICINE

## 2024-10-17 PROCEDURE — 6360000002 HC RX W HCPCS: Performed by: EMERGENCY MEDICINE

## 2024-10-17 PROCEDURE — 90714 TD VACC NO PRESV 7 YRS+ IM: CPT | Performed by: EMERGENCY MEDICINE

## 2024-10-17 PROCEDURE — 26010 DRAINAGE OF FINGER ABSCESS: CPT

## 2024-10-17 PROCEDURE — 6370000000 HC RX 637 (ALT 250 FOR IP): Performed by: EMERGENCY MEDICINE

## 2024-10-17 RX ORDER — ACETAMINOPHEN 500 MG
1000 TABLET ORAL 3 TIMES DAILY
Qty: 120 TABLET | Refills: 3 | Status: SHIPPED | OUTPATIENT
Start: 2024-10-17

## 2024-10-17 RX ORDER — CEPHALEXIN 500 MG/1
500 CAPSULE ORAL 4 TIMES DAILY
Qty: 28 CAPSULE | Refills: 0 | Status: SHIPPED | OUTPATIENT
Start: 2024-10-17 | End: 2024-10-17

## 2024-10-17 RX ORDER — CEPHALEXIN 500 MG/1
500 CAPSULE ORAL 4 TIMES DAILY
Qty: 28 CAPSULE | Refills: 0 | Status: SHIPPED | OUTPATIENT
Start: 2024-10-17 | End: 2024-10-24

## 2024-10-17 RX ORDER — LIDOCAINE HYDROCHLORIDE 10 MG/ML
10 INJECTION, SOLUTION EPIDURAL; INFILTRATION; INTRACAUDAL; PERINEURAL ONCE
Status: COMPLETED | OUTPATIENT
Start: 2024-10-17 | End: 2024-10-17

## 2024-10-17 RX ORDER — IBUPROFEN 600 MG/1
600 TABLET, FILM COATED ORAL EVERY 6 HOURS PRN
Qty: 28 TABLET | Refills: 0 | Status: SHIPPED | OUTPATIENT
Start: 2024-10-17 | End: 2024-10-17

## 2024-10-17 RX ORDER — ACETAMINOPHEN 500 MG
1000 TABLET ORAL 3 TIMES DAILY
Qty: 120 TABLET | Refills: 3 | Status: SHIPPED | OUTPATIENT
Start: 2024-10-17 | End: 2024-10-17

## 2024-10-17 RX ORDER — IBUPROFEN 600 MG/1
600 TABLET, FILM COATED ORAL EVERY 6 HOURS PRN
Qty: 28 TABLET | Refills: 0 | Status: SHIPPED | OUTPATIENT
Start: 2024-10-17 | End: 2024-10-24

## 2024-10-17 RX ADMIN — LIDOCAINE HYDROCHLORIDE 10 ML: 10 INJECTION, SOLUTION EPIDURAL; INFILTRATION; INTRACAUDAL; PERINEURAL at 22:06

## 2024-10-17 RX ADMIN — CLOSTRIDIUM TETANI TOXOID ANTIGEN (FORMALDEHYDE INACTIVATED) AND CORYNEBACTERIUM DIPHTHERIAE TOXOID ANTIGEN (FORMALDEHYDE INACTIVATED) 0.5 ML: 5; 2 INJECTION, SUSPENSION INTRAMUSCULAR at 22:05

## 2024-10-17 RX ADMIN — CEPHALEXIN 500 MG: 250 CAPSULE ORAL at 22:05

## 2024-10-17 ASSESSMENT — PAIN DESCRIPTION - PAIN TYPE: TYPE: ACUTE PAIN

## 2024-10-17 ASSESSMENT — PAIN DESCRIPTION - DESCRIPTORS: DESCRIPTORS: TIGHTNESS

## 2024-10-17 ASSESSMENT — PAIN SCALES - GENERAL: PAINLEVEL_OUTOF10: 6

## 2024-10-17 ASSESSMENT — PAIN - FUNCTIONAL ASSESSMENT
PAIN_FUNCTIONAL_ASSESSMENT: ACTIVITIES ARE NOT PREVENTED
PAIN_FUNCTIONAL_ASSESSMENT: 0-10

## 2024-10-17 ASSESSMENT — PAIN DESCRIPTION - LOCATION: LOCATION: FINGER (COMMENT WHICH ONE)

## 2024-10-17 ASSESSMENT — PAIN DESCRIPTION - ORIENTATION: ORIENTATION: RIGHT

## 2024-10-18 NOTE — ED TRIAGE NOTES
Pt ambulatory to treatment area c/o atraumatic right thumb swelling. Pt reports PMH of similar to \"due to chewing nails.\" Pt reports symptoms began 3 days ago, denies fever.

## 2024-10-18 NOTE — ED PROVIDER NOTES
[facility] EMERGENCY DEPT  EMERGENCY DEPARTMENT ENCOUNTER      Pt Name: Mark Mclean  MRN: [id number]  Birthdate 1990  Date of evaluation: 10/17/2024  Provider: Issac Ruth MD    CHIEF COMPLAINT       Chief Complaint   Patient presents with    Finger Swelling       EMERGENCY DEPARTMENT COURSE and DIFFERENTIAL DIAGNOSIS/MDM:   Medical Decision Making  34-year-old male presenting ER with report of right thumb swelling and tenderness.  Patient concern for infection.  Reports that he does chew on his fingernails.  Symptoms started 3 days ago and has been worsening.  Denies any fevers or chills.  Worsening pain to palpation and movement.  Unknown last tetanus shot.  Denies any traumatic injuries to the area.  Patient also complains of chronic ongoing jock itch.  Reports that he seen his family doctor previously for this tried nystatin and has been using over-the-counter Lotrimin ultra without improvement.  Patient does admit to cleaning the area with a towel when he gets sweaty and pleats same close back on and does not change the tile.  On exam patient has swelling erythema and fluctuant mass adjacent to the fingernail on the right thumb.  Thumb is swollen however flexion still intact.  No lymphangitis.  No signs of Ibis.  At this time no concern for flexor tenosynovitis.  Patient is afebrile no tachycardia.  On skin exam patient does have mild erythema but no induration or crepitus.  Scaly rash with satellite lesions in the groin pelvic area.  Concerning for tinea infection.  Anesthetized/digital block for thumb and opened up paronychia with scant purulent drainage.  Cleaned area and bandaged.  Discussed antibiotic regiment and soaking the thumb in warm water several times a day.  Given referral information for hand specialist.  Also discussed that patient is likely continuing to reexpose himself in regards to the yeast infection making treatment failure less likely.  I did  patient on cleaning and

## (undated) DEVICE — PADDING CAST SPEC 6INX4YD COT --

## (undated) DEVICE — REM POLYHESIVE ADULT PATIENT RETURN ELECTRODE: Brand: VALLEYLAB

## (undated) DEVICE — HOOK LOCK LATEX FREE ELASTIC BANDAGE 6INX5YD

## (undated) DEVICE — 1200 GUARD II KIT W/5MM TUBE W/O VAC TUBE: Brand: GUARDIAN

## (undated) DEVICE — KENDALL DL ECG CABLE AND LEAD WIRE SYSTEM, 3-LEAD, SINGLE PATIENT USE: Brand: KENDALL

## (undated) DEVICE — Z DISCONTINUED NO SUB IDED BUCKET CAST INF CLAV STRP WHT BCKL CLSR PREM DISPOSABLE

## (undated) DEVICE — SET 2ND L34IN N DEHP THE QUEENS MED CNTR VALUELINK

## (undated) DEVICE — ROCKER SWITCH PENCIL BLADE ELECTRODE, HOLSTER: Brand: EDGE

## (undated) DEVICE — PADDING CAST 4 INX5 YD STRL

## (undated) DEVICE — 3M™ STERI-STRIP™ ELASTIC SKIN CLOSURES, E4541, 1/4 IN X 3 IN (6 MM X 75 MM), 3 STRIPS/ENVELOPE: Brand: 3M™ STERI-STRIP™

## (undated) DEVICE — BANDAGE COMPR SELF ADH 5 YDX6 IN TAN STRL PREMIERPRO LF

## (undated) DEVICE — GAUZE SPONGES,12 PLY: Brand: CURITY

## (undated) DEVICE — TOWEL SURG W17XL27IN STD BLU COT NONFENESTRATED PREWASHED

## (undated) DEVICE — SPONGE LAP 18X18IN STRL -- 5/PK

## (undated) DEVICE — Device

## (undated) DEVICE — SUTURE MCRYL SZ 4 0 L18IN ABSRB UD PC 5 L19MM 3 8 CIR SGL Y823G

## (undated) DEVICE — OCCLUSIVE GAUZE STRIP,3% BISMUTH TRIBROMOPHENATE IN PETROLATUM BLEND: Brand: XEROFORM

## (undated) DEVICE — STERILE POLYISOPRENE POWDER-FREE SURGICAL GLOVES WITH EMOLLIENT COATING: Brand: PROTEXIS

## (undated) DEVICE — SYRINGE BLB 50CC IRRIG PLIABLE FNGR FLNG GRAD FLSK DISP

## (undated) DEVICE — SOLUTION LACTATED RINGERS INJECTION USP

## (undated) DEVICE — DRAPE C ARM W41XL59IN MINI ELAS OPN

## (undated) DEVICE — GOWN,SIRUS,NONRNF,SETINSLV,XL,20/CS: Brand: MEDLINE

## (undated) DEVICE — LIGHT HANDLE: Brand: DEVON

## (undated) DEVICE — SUTURE TICRON DBL ARMED 2 0 CV 305 42IN BLU N ABSRB BRAID 8886303551

## (undated) DEVICE — SUT ETHLN 3-0 18IN PS2 BLK --

## (undated) DEVICE — FRAZIER SUCTION INSTRUMENT 12 FR W/CONTROL VENT & OBTURATOR: Brand: FRAZIER

## (undated) DEVICE — STERILE POLYISOPRENE POWDER-FREE SURGICAL GLOVES: Brand: PROTEXIS

## (undated) DEVICE — ZIMMER® STERILE DISPOSABLE TOURNIQUET CUFF WITH PLC, DUAL PORT, SINGLE BLADDER, 34 IN. (86 CM)

## (undated) DEVICE — PADDING CAST SOF-ROL 6INX4YD --

## (undated) DEVICE — INFECTION CONTROL KIT SYS

## (undated) DEVICE — INTENT TO BE USED WITH SUTURE MATERIAL FOR TISSUE CLOSURE: Brand: RICHARD-ALLAN® NEEDLE STRAIGHT CUTTING

## (undated) DEVICE — SOLUTION IV 1000ML 0.9% SOD CHL

## (undated) DEVICE — HOOK LOCK LATEX FREE ELASTIC BANDAGE D/L 6INX10YD

## (undated) DEVICE — SPLINT MAT XF SPEC 5X30IN --

## (undated) DEVICE — EXTREMITY III-LF: Brand: MEDLINE INDUSTRIES, INC.

## (undated) DEVICE — CONTINU-FLO SOLUTION SET, 2 INJECTION SITES, MALE LUER LOCK ADAPTER WITH RETRACTABLE COLLAR, LARGE BORE STOPCOCK WITH ROTATING MALE LUER LOCK EXTENSION SET, 2 INJECTION SITES, MALE LUER LOCK ADAPTER WITH RETRACTABLE COLLAR: Brand: INTERLINK/CONTINU-FLO

## (undated) DEVICE — BANDAGE COMPR SELF ADH 5 YDX4 IN TAN STRL PREMIERPRO LF